# Patient Record
Sex: MALE | Race: WHITE | NOT HISPANIC OR LATINO | Employment: OTHER | URBAN - METROPOLITAN AREA
[De-identification: names, ages, dates, MRNs, and addresses within clinical notes are randomized per-mention and may not be internally consistent; named-entity substitution may affect disease eponyms.]

---

## 2017-01-27 ENCOUNTER — GENERIC CONVERSION - ENCOUNTER (OUTPATIENT)
Dept: OTHER | Facility: OTHER | Age: 37
End: 2017-01-27

## 2017-01-31 ENCOUNTER — ALLSCRIPTS OFFICE VISIT (OUTPATIENT)
Dept: OTHER | Facility: OTHER | Age: 37
End: 2017-01-31

## 2017-02-17 ENCOUNTER — APPOINTMENT (EMERGENCY)
Dept: RADIOLOGY | Facility: HOSPITAL | Age: 37
End: 2017-02-17
Payer: MEDICARE

## 2017-02-17 ENCOUNTER — HOSPITAL ENCOUNTER (EMERGENCY)
Facility: HOSPITAL | Age: 37
Discharge: HOME | End: 2017-02-17
Attending: EMERGENCY MEDICINE | Admitting: EMERGENCY MEDICINE
Payer: MEDICARE

## 2017-02-17 VITALS
BODY MASS INDEX: 35.78 KG/M2 | SYSTOLIC BLOOD PRESSURE: 155 MMHG | HEART RATE: 92 BPM | OXYGEN SATURATION: 98 % | TEMPERATURE: 98.4 F | WEIGHT: 215 LBS | RESPIRATION RATE: 18 BRPM | DIASTOLIC BLOOD PRESSURE: 72 MMHG

## 2017-02-17 DIAGNOSIS — S80.11XA CONTUSION OF RIGHT TIBIA: ICD-10-CM

## 2017-02-17 DIAGNOSIS — T07.XXXA MULTIPLE ABRASIONS: ICD-10-CM

## 2017-02-17 DIAGNOSIS — S50.11XA CONTUSION OF RIGHT ELBOW AND FOREARM, INITIAL ENCOUNTER: ICD-10-CM
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
## 2017-02-17 DIAGNOSIS — M25.511 ACUTE PAIN OF RIGHT SHOULDER: ICD-10-CM

## 2017-02-17 DIAGNOSIS — S00.33XA CONTUSION OF NOSE, INITIAL ENCOUNTER: ICD-10-CM

## 2017-02-17 LAB
ABO GROUP BLD: NORMAL
ANION GAP SERPL CALCULATED.3IONS-SCNC: 7 MMOL/L (ref 4–13)
APTT PPP: 29 SECONDS (ref 24–36)
ATRIAL RATE: 79 BPM
BASOPHILS # BLD AUTO: 0.01 THOUSANDS/ΜL (ref 0–0.1)
BASOPHILS NFR BLD AUTO: 0 % (ref 0–1)
BLD GP AB SCN SERPL QL: NEGATIVE
BUN SERPL-MCNC: 13 MG/DL (ref 5–25)
CALCIUM SERPL-MCNC: 9.4 MG/DL (ref 8.3–10.1)
CHLORIDE SERPL-SCNC: 98 MMOL/L (ref 100–108)
CO2 SERPL-SCNC: 28 MMOL/L (ref 21–32)
CREAT SERPL-MCNC: 1.1 MG/DL (ref 0.6–1.3)
EOSINOPHIL # BLD AUTO: 0.05 THOUSAND/ΜL (ref 0–0.61)
EOSINOPHIL NFR BLD AUTO: 1 % (ref 0–6)
ERYTHROCYTE [DISTWIDTH] IN BLOOD BY AUTOMATED COUNT: 13.8 % (ref 11.6–15.1)
GFR SERPL CREATININE-BSD FRML MDRD: >60 ML/MIN/1.73SQ M
GLUCOSE SERPL-MCNC: 97 MG/DL (ref 65–140)
HCT VFR BLD AUTO: 37 % (ref 36.5–49.3)
HGB BLD-MCNC: 11.7 G/DL (ref 12–17)
INR PPP: 1.06 (ref 0.86–1.16)
LYMPHOCYTES # BLD AUTO: 1.89 THOUSANDS/ΜL (ref 0.6–4.47)
LYMPHOCYTES NFR BLD AUTO: 19 % (ref 14–44)
MCH RBC QN AUTO: 27.5 PG (ref 26.8–34.3)
MCHC RBC AUTO-ENTMCNC: 31.6 G/DL (ref 31.4–37.4)
MCV RBC AUTO: 87 FL (ref 82–98)
MONOCYTES # BLD AUTO: 0.61 THOUSAND/ΜL (ref 0.17–1.22)
MONOCYTES NFR BLD AUTO: 6 % (ref 4–12)
NEUTROPHILS # BLD AUTO: 7.55 THOUSANDS/ΜL (ref 1.85–7.62)
NEUTS SEG NFR BLD AUTO: 74 % (ref 43–75)
NRBC BLD AUTO-RTO: 0 /100 WBCS
P AXIS: 48 DEGREES
PLATELET # BLD AUTO: 437 THOUSANDS/UL (ref 149–390)
PMV BLD AUTO: 8.5 FL (ref 8.9–12.7)
POTASSIUM SERPL-SCNC: 4 MMOL/L (ref 3.5–5.3)
PR INTERVAL: 132 MS
PROTHROMBIN TIME: 13.9 SECONDS (ref 12–14.3)
QRS AXIS: 71 DEGREES
QRSD INTERVAL: 86 MS
QT INTERVAL: 354 MS
QTC INTERVAL: 405 MS
RBC # BLD AUTO: 4.26 MILLION/UL (ref 3.88–5.62)
RH BLD: POSITIVE
SODIUM SERPL-SCNC: 133 MMOL/L (ref 136–145)
T WAVE AXIS: 42 DEGREES
VENTRICULAR RATE: 79 BPM
WBC # BLD AUTO: 10.13 THOUSAND/UL (ref 4.31–10.16)

## 2017-02-17 PROCEDURE — 73610 X-RAY EXAM OF ANKLE: CPT

## 2017-02-17 PROCEDURE — 96374 THER/PROPH/DIAG INJ IV PUSH: CPT

## 2017-02-17 PROCEDURE — 85025 COMPLETE CBC W/AUTO DIFF WBC: CPT | Performed by: EMERGENCY MEDICINE

## 2017-02-17 PROCEDURE — 73030 X-RAY EXAM OF SHOULDER: CPT

## 2017-02-17 PROCEDURE — 85730 THROMBOPLASTIN TIME PARTIAL: CPT | Performed by: EMERGENCY MEDICINE

## 2017-02-17 PROCEDURE — 86900 BLOOD TYPING SEROLOGIC ABO: CPT | Performed by: EMERGENCY MEDICINE

## 2017-02-17 PROCEDURE — 90715 TDAP VACCINE 7 YRS/> IM: CPT | Performed by: EMERGENCY MEDICINE

## 2017-02-17 PROCEDURE — 93005 ELECTROCARDIOGRAM TRACING: CPT | Performed by: EMERGENCY MEDICINE

## 2017-02-17 PROCEDURE — 86901 BLOOD TYPING SEROLOGIC RH(D): CPT | Performed by: EMERGENCY MEDICINE

## 2017-02-17 PROCEDURE — 71010 HB CHEST X-RAY 1 VIEW FRONTAL (PORTABLE): CPT

## 2017-02-17 PROCEDURE — 80048 BASIC METABOLIC PNL TOTAL CA: CPT | Performed by: EMERGENCY MEDICINE

## 2017-02-17 PROCEDURE — 71010 HB CHEST X-RAY 1 VIEW FRONTAL: CPT

## 2017-02-17 PROCEDURE — 73590 X-RAY EXAM OF LOWER LEG: CPT

## 2017-02-17 PROCEDURE — 99285 EMERGENCY DEPT VISIT HI MDM: CPT

## 2017-02-17 PROCEDURE — 73110 X-RAY EXAM OF WRIST: CPT

## 2017-02-17 PROCEDURE — 72125 CT NECK SPINE W/O DYE: CPT

## 2017-02-17 PROCEDURE — 85610 PROTHROMBIN TIME: CPT | Performed by: EMERGENCY MEDICINE

## 2017-02-17 PROCEDURE — 96375 TX/PRO/DX INJ NEW DRUG ADDON: CPT

## 2017-02-17 PROCEDURE — 96376 TX/PRO/DX INJ SAME DRUG ADON: CPT

## 2017-02-17 PROCEDURE — 36415 COLL VENOUS BLD VENIPUNCTURE: CPT | Performed by: EMERGENCY MEDICINE

## 2017-02-17 PROCEDURE — 73564 X-RAY EXAM KNEE 4 OR MORE: CPT

## 2017-02-17 PROCEDURE — 86850 RBC ANTIBODY SCREEN: CPT | Performed by: EMERGENCY MEDICINE

## 2017-02-17 PROCEDURE — 70450 CT HEAD/BRAIN W/O DYE: CPT

## 2017-02-17 PROCEDURE — 73080 X-RAY EXAM OF ELBOW: CPT

## 2017-02-17 PROCEDURE — 90471 IMMUNIZATION ADMIN: CPT

## 2017-02-17 RX ORDER — IBUPROFEN 600 MG/1
600 TABLET ORAL EVERY 6 HOURS PRN
Qty: 30 TABLET | Refills: 0 | Status: SHIPPED | OUTPATIENT
Start: 2017-02-17 | End: 2017-07-06

## 2017-02-17 RX ORDER — BACITRACIN, NEOMYCIN, POLYMYXIN B 400; 3.5; 5 [USP'U]/G; MG/G; [USP'U]/G
1 OINTMENT TOPICAL ONCE
Status: COMPLETED | OUTPATIENT
Start: 2017-02-17 | End: 2017-02-17

## 2017-02-17 RX ORDER — KETOROLAC TROMETHAMINE 30 MG/ML
15 INJECTION, SOLUTION INTRAMUSCULAR; INTRAVENOUS ONCE
Status: COMPLETED | OUTPATIENT
Start: 2017-02-17 | End: 2017-02-17

## 2017-02-17 RX ORDER — GINSENG 100 MG
1 CAPSULE ORAL ONCE
Status: DISCONTINUED | OUTPATIENT
Start: 2017-02-17 | End: 2017-02-17

## 2017-02-17 RX ORDER — ALPRAZOLAM 1 MG/1
TABLET ORAL
COMMUNITY
Start: 2016-03-01 | End: 2017-02-17

## 2017-02-17 RX ORDER — VENLAFAXINE HYDROCHLORIDE 150 MG/1
CAPSULE, EXTENDED RELEASE ORAL
COMMUNITY
Start: 2016-12-15 | End: 2017-07-06

## 2017-02-17 RX ORDER — IBUPROFEN 200 MG
TABLET ORAL
COMMUNITY
End: 2017-02-17

## 2017-02-17 RX ORDER — TRAZODONE HYDROCHLORIDE 100 MG/1
TABLET ORAL
COMMUNITY
Start: 2016-12-15 | End: 2017-02-17

## 2017-02-17 RX ORDER — BACITRACIN, NEOMYCIN, POLYMYXIN B 400; 3.5; 5 [USP'U]/G; MG/G; [USP'U]/G
OINTMENT TOPICAL
Status: COMPLETED
Start: 2017-02-17 | End: 2017-02-17

## 2017-02-17 RX ADMIN — HYDROMORPHONE HYDROCHLORIDE 1 MG: 1 INJECTION, SOLUTION INTRAMUSCULAR; INTRAVENOUS; SUBCUTANEOUS at 17:20

## 2017-02-17 RX ADMIN — BACITRACIN, NEOMYCIN, POLYMYXIN B 1 SMALL APPLICATION: 400; 3.5; 5 OINTMENT TOPICAL at 19:06

## 2017-02-17 RX ADMIN — Medication 1 MG: at 16:25

## 2017-02-17 RX ADMIN — TETANUS TOXOID, REDUCED DIPHTHERIA TOXOID AND ACELLULAR PERTUSSIS VACCINE, ADSORBED 0.5 ML: 5; 2.5; 8; 8; 2.5 SUSPENSION INTRAMUSCULAR at 18:56

## 2017-02-17 RX ADMIN — HYDROMORPHONE HYDROCHLORIDE 1 MG: 1 INJECTION, SOLUTION INTRAMUSCULAR; INTRAVENOUS; SUBCUTANEOUS at 16:25

## 2017-02-17 RX ADMIN — KETOROLAC TROMETHAMINE 15 MG: 30 INJECTION, SOLUTION INTRAMUSCULAR at 19:25

## 2017-04-03 ENCOUNTER — ALLSCRIPTS OFFICE VISIT (OUTPATIENT)
Dept: OTHER | Facility: OTHER | Age: 37
End: 2017-04-03

## 2017-05-03 ENCOUNTER — ALLSCRIPTS OFFICE VISIT (OUTPATIENT)
Dept: OTHER | Facility: OTHER | Age: 37
End: 2017-05-03

## 2017-05-30 ENCOUNTER — GENERIC CONVERSION - ENCOUNTER (OUTPATIENT)
Dept: OTHER | Facility: OTHER | Age: 37
End: 2017-05-30

## 2017-06-07 ENCOUNTER — ALLSCRIPTS OFFICE VISIT (OUTPATIENT)
Dept: OTHER | Facility: OTHER | Age: 37
End: 2017-06-07

## 2017-07-06 ENCOUNTER — APPOINTMENT (EMERGENCY)
Dept: RADIOLOGY | Facility: HOSPITAL | Age: 37
End: 2017-07-06
Payer: MEDICARE

## 2017-07-06 ENCOUNTER — HOSPITAL ENCOUNTER (EMERGENCY)
Facility: HOSPITAL | Age: 37
Discharge: HOME | End: 2017-07-06
Attending: EMERGENCY MEDICINE | Admitting: EMERGENCY MEDICINE
Payer: MEDICARE

## 2017-07-06 VITALS
TEMPERATURE: 96.9 F | SYSTOLIC BLOOD PRESSURE: 150 MMHG | OXYGEN SATURATION: 95 % | HEIGHT: 65 IN | DIASTOLIC BLOOD PRESSURE: 78 MMHG | HEART RATE: 95 BPM | BODY MASS INDEX: 35.82 KG/M2 | RESPIRATION RATE: 16 BRPM | WEIGHT: 215 LBS

## 2017-07-06 DIAGNOSIS — R00.0 TACHYCARDIA: ICD-10-CM

## 2017-07-06 DIAGNOSIS — F11.10 HEROIN ABUSE (HCC): ICD-10-CM

## 2017-07-06 DIAGNOSIS — R11.0 NAUSEA: ICD-10-CM

## 2017-07-06 DIAGNOSIS — T50.901A OVERDOSE: Primary | ICD-10-CM

## 2017-07-06 LAB
ANION GAP SERPL CALCULATED.3IONS-SCNC: 4 MMOL/L (ref 4–13)
ATRIAL RATE: 107 BPM
ATRIAL RATE: 114 BPM
BASOPHILS # BLD AUTO: 0.01 THOUSANDS/ΜL (ref 0–0.1)
BASOPHILS NFR BLD AUTO: 0 % (ref 0–1)
BILIRUB UR QL STRIP: NEGATIVE
BUN SERPL-MCNC: 11 MG/DL (ref 5–25)
CALCIUM SERPL-MCNC: 8.2 MG/DL (ref 8.3–10.1)
CHLORIDE SERPL-SCNC: 102 MMOL/L (ref 100–108)
CLARITY UR: CLEAR
CO2 SERPL-SCNC: 28 MMOL/L (ref 21–32)
COLOR UR: YELLOW
COLOR, POC: YELLOW
CREAT SERPL-MCNC: 1.01 MG/DL (ref 0.6–1.3)
DEPRECATED D DIMER PPP: 897 NG/ML (FEU) (ref 0–424)
EOSINOPHIL # BLD AUTO: 0.02 THOUSAND/ΜL (ref 0–0.61)
EOSINOPHIL NFR BLD AUTO: 0 % (ref 0–6)
ERYTHROCYTE [DISTWIDTH] IN BLOOD BY AUTOMATED COUNT: 14.4 % (ref 11.6–15.1)
GFR SERPL CREATININE-BSD FRML MDRD: >60 ML/MIN/1.73SQ M
GLUCOSE SERPL-MCNC: 124 MG/DL (ref 65–140)
GLUCOSE UR STRIP-MCNC: NEGATIVE MG/DL
HCT VFR BLD AUTO: 36.3 % (ref 36.5–49.3)
HGB BLD-MCNC: 11.7 G/DL (ref 12–17)
HGB UR QL STRIP.AUTO: NEGATIVE
KETONES UR STRIP-MCNC: NEGATIVE MG/DL
LEUKOCYTE ESTERASE UR QL STRIP: NEGATIVE
LYMPHOCYTES # BLD AUTO: 0.78 THOUSANDS/ΜL (ref 0.6–4.47)
LYMPHOCYTES NFR BLD AUTO: 9 % (ref 14–44)
MCH RBC QN AUTO: 28.3 PG (ref 26.8–34.3)
MCHC RBC AUTO-ENTMCNC: 32.2 G/DL (ref 31.4–37.4)
MCV RBC AUTO: 88 FL (ref 82–98)
MONOCYTES # BLD AUTO: 0.6 THOUSAND/ΜL (ref 0.17–1.22)
MONOCYTES NFR BLD AUTO: 7 % (ref 4–12)
NEUTROPHILS # BLD AUTO: 7.66 THOUSANDS/ΜL (ref 1.85–7.62)
NEUTS SEG NFR BLD AUTO: 84 % (ref 43–75)
NITRITE UR QL STRIP: NEGATIVE
NRBC BLD AUTO-RTO: 0 /100 WBCS
P AXIS: 30 DEGREES
P AXIS: 38 DEGREES
PH UR STRIP.AUTO: 6 [PH] (ref 4.5–8)
PLATELET # BLD AUTO: 242 THOUSANDS/UL (ref 149–390)
PMV BLD AUTO: 8.9 FL (ref 8.9–12.7)
POTASSIUM SERPL-SCNC: 3.8 MMOL/L (ref 3.5–5.3)
PR INTERVAL: 134 MS
PR INTERVAL: 136 MS
PROT UR STRIP-MCNC: NEGATIVE MG/DL
QRS AXIS: 56 DEGREES
QRS AXIS: 62 DEGREES
QRSD INTERVAL: 90 MS
QRSD INTERVAL: 94 MS
QT INTERVAL: 310 MS
QT INTERVAL: 318 MS
QTC INTERVAL: 413 MS
QTC INTERVAL: 438 MS
RBC # BLD AUTO: 4.13 MILLION/UL (ref 3.88–5.62)
SODIUM SERPL-SCNC: 134 MMOL/L (ref 136–145)
SP GR UR STRIP.AUTO: 1.01 (ref 1–1.03)
T WAVE AXIS: 20 DEGREES
T WAVE AXIS: 22 DEGREES
UROBILINOGEN UR QL STRIP.AUTO: 0.2 E.U./DL
VENTRICULAR RATE: 107 BPM
VENTRICULAR RATE: 114 BPM
WBC # BLD AUTO: 9.09 THOUSAND/UL (ref 4.31–10.16)

## 2017-07-06 PROCEDURE — 81002 URINALYSIS NONAUTO W/O SCOPE: CPT | Performed by: EMERGENCY MEDICINE

## 2017-07-06 PROCEDURE — 80048 BASIC METABOLIC PNL TOTAL CA: CPT | Performed by: EMERGENCY MEDICINE

## 2017-07-06 PROCEDURE — 96374 THER/PROPH/DIAG INJ IV PUSH: CPT

## 2017-07-06 PROCEDURE — 71020 HB CHEST X-RAY 2VW FRONTAL&LATL: CPT

## 2017-07-06 PROCEDURE — 85025 COMPLETE CBC W/AUTO DIFF WBC: CPT | Performed by: EMERGENCY MEDICINE

## 2017-07-06 PROCEDURE — 93005 ELECTROCARDIOGRAM TRACING: CPT

## 2017-07-06 PROCEDURE — 85379 FIBRIN DEGRADATION QUANT: CPT | Performed by: EMERGENCY MEDICINE

## 2017-07-06 PROCEDURE — 71275 CT ANGIOGRAPHY CHEST: CPT

## 2017-07-06 PROCEDURE — 96361 HYDRATE IV INFUSION ADD-ON: CPT

## 2017-07-06 PROCEDURE — 99285 EMERGENCY DEPT VISIT HI MDM: CPT

## 2017-07-06 PROCEDURE — 36415 COLL VENOUS BLD VENIPUNCTURE: CPT | Performed by: EMERGENCY MEDICINE

## 2017-07-06 PROCEDURE — 81003 URINALYSIS AUTO W/O SCOPE: CPT

## 2017-07-06 RX ORDER — LORAZEPAM 2 MG/ML
1 INJECTION INTRAMUSCULAR ONCE
Status: COMPLETED | OUTPATIENT
Start: 2017-07-06 | End: 2017-07-06

## 2017-07-06 RX ADMIN — LORAZEPAM 1 MG: 2 INJECTION INTRAMUSCULAR; INTRAVENOUS at 11:16

## 2017-07-06 RX ADMIN — IOHEXOL 85 ML: 350 INJECTION, SOLUTION INTRAVENOUS at 14:13

## 2017-07-06 RX ADMIN — SODIUM CHLORIDE 1000 ML: 0.9 INJECTION, SOLUTION INTRAVENOUS at 08:28

## 2017-07-06 RX ADMIN — SODIUM CHLORIDE 1000 ML: 0.9 INJECTION, SOLUTION INTRAVENOUS at 11:17

## 2017-10-03 ENCOUNTER — GENERIC CONVERSION - ENCOUNTER (OUTPATIENT)
Dept: OTHER | Facility: OTHER | Age: 37
End: 2017-10-03

## 2018-01-10 NOTE — MISCELLANEOUS
Provider Comments  Provider Comments:   Patient was a no show for today's scheduled appointment at 2:30 PM       Signatures   Electronically signed by : Aris Motta MD; Jun 27 2016  2:58PM EST                       (Author)

## 2018-01-10 NOTE — RESULT NOTES
Verified Results  (1) CULTURE, BLOOD BACT 18Apr2016 10:03AM Patience Conway     Test Name Result Flag Reference   CLINICAL REPORT (Report)     Test:        Blood culture  Specimen Type:   Blood  Specimen Date:   4/18/2016 10:03 AM  Result Date:    4/23/2016 4:01 PM  Result Status:   Final result  Resulting Lab:   [facility] [address] [address]            Tel: [phone]                 CULTURE                                       ------------------                                   No Growth After 5 Days  Discussion/Summary   blood culture was negative

## 2018-01-10 NOTE — MISCELLANEOUS
Message   Recorded as Task   Date: 09/27/2016 11:47 AM, Created By: Jessica Banegas   Task Name: Med Renewal Request   Assigned To: Sybil Bass   Regarding Patient: Lillie Crain, Status: Active   CommentKemal Ross - 27 Sep 2016 11:47 AM     TASK CREATED  Caller: Self; Renew Medication; [phone] (Home)  saw Dr Lexi Castrejon, has an appt  with you 10/20, needs to have the following scripts filled:    Alprazolam - 1mg  Zoloft - 150mg  [facility]     patient cell - [phone]   Andry Oksana left msg requesting renewal of Sertraline and Alprazolam  Return appt is 10/20/2016   I renewed the following to the designated [facility] pharmacy:  Sertraline 100mg (1 1/2) tabs po qd # 45  Alprazolam 1mg (1) tab po tid prn anxiety # 60      Plan  Anxiety    · ALPRAZolam 1 MG Oral Tablet; 1 TABLET PO TID PRN ANXIETY  Anxiety,     · Sertraline HCl - 100 MG Oral Tablet (Zoloft); TAKE 1 and one=half  TABLET  DAILY    Signatures   Electronically signed by : DUSTIN Decker; Sep 27 2016  7:11PM EST                       (Author)

## 2018-01-11 NOTE — MISCELLANEOUS
Message   Recorded as Task   Date: 10/28/2016 12:21 PM, Created By: Calton Hodgkin   Task Name: Medical Complaint Callback   Assigned To: Ju Prater   Regarding Patient: Michael Mayorga, Status: Active   CommentLeone Jakeamity - 28 Oct 2016 12:21 PM     TASK CREATED  Caller: Self; Medical Complaint; [phone] (Home)  saw you last fri, calling back for something to help him sleep, pls call him  [phone]   I contacted Olamide Portillo on home phone # of record and discussed options  He had tried Trazodone at 100mg but it did not work  He had tried Zolpidem in the past at 10mg which worked better than 5mg tab  I discussed the risks, benefits and SE of the drug and Pt verbalized understanding and acceptance  Return appt is 12/15/2016   I called in the following to the [facility] pharmacy he designated:  Zolpidem 10mg (1) tab po qhs prn anxiety # 30 R1       Plan  Insomnia    · Zolpidem Tartrate 10 MG Oral Tablet; TAKE 1 TABLET PO AT  BEDTIME AS  NEEDED FOR INSOMNIA    Signatures   Electronically signed by : DUSTIN Perdomo; Oct 28 2016  6:02PM EST                       (Author)

## 2018-01-11 NOTE — PSYCH
Psych Med Mgmt    Appearance: was calm and cooperative, adequate hygiene and grooming and good eye contact  Observed mood: depressed and anxious  Observed mood: affect was constricted  Speech: a normal rate, speech soft and fluent  Thought processes: coherent/organized  Hallucinations: no hallucinations present  Thought Content: no delusions  Abnormal Thoughts: The patient has no suicidal thoughts and no homicidal thoughts  Orientation: The patient is oriented to person, place and time  Recent and Remote Memory: short term memory intact and long term memory intact  Attention Span And Concentration: concentration intact  Insight: Insight intact  Judgment: His judgment was intact  Muscle Strength And Tone  Antalgic gait, but steady  Language: no difficulty naming common objects, no difficulty repeating a phrase and no difficulty writing a sentence  Fund of knowledge: Patient displays adequate knowledge of current events, adequate fund of knowledge regarding past history and adequate fund of knowledge regarding vocabulary  The patient is experiencing moderate to severe pain  On a scale of 0 - 10 the pain severity is a 7  In hips and back  Treatment Recommendations: See plan  Risks, Benefits And Possible Side Effects Of Medications: Risks, benefits, and possible side effects of medications explained to patient and patient verbalizes understanding  Discussed with patient Black Box warning on concurrent use of benzodiazepines and opioid medications including sedation, respiratory depression, coma and death  Patient understands the risk of treatment with benzodiazepines in addition to opioids and wants to continue taking those medications  Discussed with patient the risks of sedation, respiratory depression, impairment of ability to drive and potential for abuse and addiction related to treatment with benzodiazepine medications   The patient understands risk of treatment with benzodiazepine medications, agrees to not drive if feels impaired and agrees to take medications as prescribed  The patient has been filling controlled prescriptions on time as prescribed to [facility]     He reports decreased appetite, decreased energy, no weight change and decrease in number of sleep hours   Dillan Galarza is a 35y/o white male here for medication review with primary c/o "Just anxious " He is also having panic attacks and depressed mood (these Sxs are as described below)  He is eating one meal per day but this and his mood and anxiety are in large part, due to his chronic pains and his "Family Hx" of cancers (described in below hx)  He misses his mom greatly but does not demonstrate avoidance of her home/belongings  (Pt lived with mom and still lives in the same home)  No financial stressors at this time  He presently denies any SI, HI, or manic or psychotic Sxs  Pt was first diagnosed with a psychiatric illness (Depression and anxiety) in  by PMD  His Sxs were many weeks of daily sadness, low energy, motivation and concentration, feeling overwhelmed, sometimes hopeless and helpless, once had SI, lack of appetite, and insomnia  He also had anxiety with nausea (and sometimes vomiting), fatigue, difficulty concentrating, insomnia, and edginess above and beyond the depression, and panic attacks  Panic Sxs were moderate to severe anxiety, shakes, feeling of fear, tachycardia, palpitations and SOB, Mood and anxiety triggers around that time: mom  of brain and lung CA, then 3 months after that his daughter was diagnosed with Leukemia  (Daughter has since be treated and is in remission  )    PMD prescribed Zoloft and Alprazolam and also immediately referred Pt to psychiatric care  Pt came to [place] [address] outpatient offices and first seen by Varun Jamil in 2013   She continued his PMD's medicines and tried one switch to Clonazepam but it did not work  Pt was restarted on the Alprazolam      Pt denies h/o manic or psychotic Sxs  Pt had one hospitalization in [place] for depression with SI and verbalized intent but no plan or attempt    Pt denies any h/o self-mutilation/other self harm behaviors, violent behaviors, ECT, or legal or  Hx  Results/Data  (1) CBC/PLT/DIFF 11Ism8860 09:19AM Patience Conway     Test Name Result Flag Reference   WBC COUNT 7 87 Thousand/uL  4 31-10 16   RBC COUNT 4 69 Million/uL  3 88-5 62   HEMOGLOBIN 12 6 g/dL  12 0-17 0   HEMATOCRIT 40 0 %  36 5-49 3   MCV 85 fL  82-98   MCH 26 9 pg  26 8-34 3   MCHC 31 5 g/dL  31 4-37 4   RDW 15 1 %  11 6-15 1   MPV 9 6 fL  8 9-12 7   PLATELET COUNT 494 Thousands/uL H 149-390   nRBC AUTOMATED 0 /100 WBCs     NEUTROPHILS RELATIVE PERCENT 51 %  43-75   LYMPHOCYTES RELATIVE PERCENT 37 %  14-44   MONOCYTES RELATIVE PERCENT 11 %  4-12   EOSINOPHILS RELATIVE PERCENT 1 %  0-6   BASOPHILS RELATIVE PERCENT 0 %  0-1   NEUTROPHILS ABSOLUTE COUNT 4 00 Thousands/?L  1 85-7 62   LYMPHOCYTES ABSOLUTE COUNT 2 88 Thousands/?L  0 60-4 47   MONOCYTES ABSOLUTE COUNT 0 86 Thousand/?L  0 17-1 22   EOSINOPHILS ABSOLUTE COUNT 0 10 Thousand/?L  0 00-0 61   BASOPHILS ABSOLUTE COUNT 0 01 Thousands/?L  0 00-0 10     (1) COMPREHENSIVE METABOLIC PANEL 93QRI8825 01:54FU Simran Muñoz     Test Name Result Flag Reference   GLUCOSE,RANDM 71 mg/dL     If the patient is fasting, the ADA then defines impaired fasting glucose as > 100 mg/dL and diabetes as > or equal to 123 mg/dL     SODIUM 138 mmol/L  136-145   POTASSIUM 4 3 mmol/L  3 5-5 3   CHLORIDE 101 mmol/L  100-108   CARBON DIOXIDE 29 mmol/L  21-32   ANION GAP (CALC) 8 mmol/L  4-13   BLOOD UREA NITROGEN 21 mg/dL  5-25   CREATININE 1 27 mg/dL  0 60-1 30   Standardized to IDMS reference method   CALCIUM 9 3 mg/dL  8 3-10 1   BILI, TOTAL 0 27 mg/dL  0 20-1 00   ALK PHOSPHATAS 89 U/L     ALT (SGPT) 32 U/L  12-78   AST(SGOT) 15 U/L  5-45   ALBUMIN 4 3 g/dL  3 5-5 0   TOTAL PROTEIN 7 7 g/dL  6 4-8 2   eGFR Non-African American      >60 0 ml/min/1 73sq m   [facility] Disease Education Program recommendations are as follows:  GFR calculation is accurate only with a steady state creatinine  Chronic Kidney disease less than 60 ml/min/1 73 sq  meters  Kidney failure less than 15 ml/min/1 73 sq  meters  (1) VITAMIN B12 [id number] 09:19AM Patience Conway     Test Name Result Flag Reference   VITAMIN B12 376 pg/mL  100-900     (1) TSH WITH FT4 REFLEX [id number] 09:19AM Patience Conway     Test Name Result Flag Reference   TSH 2 430 uIU/mL  0 358-3 740   Patients undergoing fluorescein dye angiography may retain small amounts of fluorescein in the body for 48-72 hours post procedure  Samples containing fluorescein can produce falsely depressed TSH values  If the patient had this procedure,a specimen should be resubmitted post fluorescein clearance  (1) LYME ANTIBODY PROFILE W/REFLEX TO WESTERN BLOT [id number] 09:19AM Patience Conway     Test Name Result Flag Reference   LYME IGG 0 06  0 00-0 79   NEGATIVE(0 00-0 79)-Absence of detectable Borrelia IgG Antibodies  A negative result does not exclude the possibility of Borrelia infection  If early Lyme disease is suspected,a second sample should be collected & tested 4 weeks after initial testing  LYME IGM 0 16  0 00-0 79   NEGATIVE (0 00-0 79)-Absence of detectable Borrelia IgM antibodies  A negative result does not exclude the possibility of Borrelia infection   If early lyme disease is suspected, a second sample should be collected & tested 4 weeks after initial testing      (1) VITAMIN B6 [id number] 09:19AM Patience Conway     Test Name Result Flag Reference   VITAMIN B6 18 4 ug/L  5 3 - 46 7   Performed at:  [address]  [id number]  : Tan Barr MD, Phone:  [phone]     (1) HEMOGLOBIN A1C 40Bqr6745 10:03AM Patience Conway   5 7-6 4% impaired fasting glucose  >=6 5% diagnosis of diabetes    Falsely low levels are seen in conditions linked to short RBC life span-  hemolytic anemia, and splenomegaly  Falsely elevated levels are seen in situations where there is an increased production of RBC- receipt of erythropoietin or blood transfusions  Adopted from ADA-Clinical Practice Recommendations     Test Name Result Flag Reference   HEMOGLOBIN A1C 5 3 %  4 0-5 6   EST  AVG  GLUCOSE 105 mg/dl       (1) LIPID PANEL FASTING W DIRECT LDL REFLEX [id number] 11:00AM Patience Conway   Triglyceride:         Normal              <150 mg/dl       Borderline High    150-199 mg/dl       High               200-499 mg/dl       Very High          >499 mg/dl  Cholesterol:         Desirable        <200 mg/dl      Borderline High  200-239 mg/dl      High             >239 mg/dl  HDL Cholesterol:        High    >59 mg/dL      Low     <41 mg/dL  LDL Cholesterol:        Optimal          <100 mg/dl         Near Optimal     100-129 mg/dl        Above Optimal          Borderline High   130-159 mg/dl          High              160-189 mg/dl          Very High        >189 mg/dl  LDL CALCULATED:    This screening LDL is a calculated result  It does not have the accuracy of the Direct Measured LDL in the monitoring of patients with hyperlipidemia and/or statin therapy  Direct Measure LDL (OXU978) must be ordered separately in these patients  Test Name Result Flag Reference   CHOLESTEROL 256 mg/dL H    LDL CHOLESTEROL CALCULATED 184 mg/dL H 0-100   TRIGLYCERIDES 164 mg/dL H <=150   HDL,DIRECT 39 mg/dL L 40-60       Vitals  Signs   Recorded: 03IAB6507 99:20VE   Systolic: 427  Diastolic: 88  Heart Rate: 92  Height: 5 ft 5 in  Weight: 213 lb 9 6 oz  BMI Calculated: 35 55  BSA Calculated: 2 03    Assessment    1  Depression, major, recurrent, moderate (296 32) (F33 1)   2  Generalized anxiety disorder (300 02) (F41 1)   3  Chronic pain (338 29) (G89 29)   4   Hypovitaminosis D (268 9) (E55 9)    Plan    1  Sertraline HCl - 100 MG Oral Tablet (Zoloft); Take 2 tabs po qd    2  ALPRAZolam 1 MG Oral Tablet; 1 TABLET PO TID PRN ANXIETY    Discussed his Sxs and Tx options  Pt is having moderate anxiety and depressive Sxs and some unresolved grief for mom  I am increasing the Sertraline and reviewed risks, benefits and SE of his medicines  Pt verbalized understanding and acceptance of the plan  Increase Sertraline to 100mg (2) tabs po qd # 60 R1  Continue:  Alprazolam 1mg (1) tab po tid prn anxiety # 90 R1  Continue Vit D supplement (over the counter)--as per PMD recommendation  I recommended psychotherapy and bereavement counseling  I also informed Pt of group therapies available  Pt will consider these  Pt to f/u with PMD and specialists   Return 2 months (soonest avail), and call sooner prn  Review of Systems    Constitutional: No fever, no chills, feels well, no tiredness, no recent weight gain or loss  Cardiovascular: no complaints of slow or fast heart rate, no chest pain, no palpitations  Respiratory: no complaints of shortness of breath, no wheezing, no dyspnea on exertion  Gastrointestinal: no complaints of abdominal pain, no constipation, no nausea, no diarrhea, no vomiting  Genitourinary: no complaints of dysuria, no incontinence, no pelvic pain, no urinary frequency  Musculoskeletal: hips and back, but as noted in HPI  Integumentary: no complaints of skin rash, no itching, no dry skin  Neurological: no complaints of headache, no confusion, no numbness, no dizziness  Past Psychiatric History    Past Psychiatric History: Pt was first diagnosed with a psychiatric illness (Depression and anxiety) in 2013 by PMD  His Sxs were many weeks of daily sadness, low energy, motivation and concentration, feeling overwhelmed, sometimes hopeless and helpless, once had SI, lack of appetite, and insomnia   He also had anxiety with nausea (and sometimes vomiting), fatigue, difficulty concentrating, insomnia, and edginess above and beyond the depression, and panic attacks  Panic Sxs were moderate to severe anxiety, shakes, feeling of fear, tachycardia, palpitations and SOB, Mood and anxiety triggers around that time: mom  of brain and lung CA, then 3 months after that his daughter was diagnosed with Leukemia  (Daughter has since be treated and is in remission  )    PMD prescribed Zoloft and Alprazolam and also immediately referred Pt to psychiatric care  Pt came to [facility] outpatient offices and first seen by Lb Mares in 2013  She continued his PMD's medicines and tried one switch to Clonazepam but it did not work  Pt was restarted on the Alprazolam      Pt denies h/o manic or psychotic Sxs  Pt had one hospitalization in [place] for depression with SI and verbalized intent but no plan or attempt    Pt denies any h/o self-mutilation/other self harm behaviors, violent behaviors, ECT, or legal or  Hx  Substance Abuse Hx    Substance Abuse History: Pt denies any ETOH use or abuse    He tried [facility] as a teenager but denies any addiction or use/abuse of any other illicit substances  Active Problems    1  Analgesic use (V58 69) (Z79 899)   2  Anxiety (300 00) (F41 9)   3  Avascular necrosis of hip (733 42) (M87 059)   4  Bilateral hip pain (719 45) (M25 551,M25 552)   5  Cerumen impaction (380 4) (H61 20)   6  Chronic pain (338 29) (G89 29)   7  Decreased appetite (783 0) (R63 0)   8  Decreased oral intake (783 9) (R63 8)   9  Depression, major, recurrent, moderate (296 32) (F33 1)   10  Elevated BP (401 9) (I10)   11  Elevated serum creatinine (790 99) (R79 89)   12  Elevated WBC count (288 60) (D72 829)   13  Fatigue (780 79) (R53 83)   14  Generalized anxiety disorder (300 02) (F41 1)   15  Greater trochanteric bursitis of both hips (726 5) (M70 62,M70 61)   16  Hospital discharge follow-up (V67 59) (Z09)   17   Hyperlipidemia (272 4) (E78 5)   18  Hypovitaminosis D (268 9) (E55 9)   19  Impaired fasting glucose (790 21) (R73 01)   20  Increased platelet count (158 46) (D47 3)   21  Insomnia (780 52) (G47 00)   22  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)   23  Low back pain (724 2) (M54 5)   24  Myofascial pain syndrome (729 1) (M79 1)   25  Nausea (787 02) (R11 0)   26  Obesity (278 00) (E66 9)   27  Osteoarthritis (arthritis due to wear and tear of joints) (715 90) (M19 90)   28  Preop examination (V72 84) (Z01 818)   29  Preoperative clearance (V72 84) (Z01 818)   30  Right calf pain (729 5) (M79 661)   31  Right ear pain (388 70) (H92 01)   32  Right shoulder pain (719 41) (M25 511)   33  S/P hip replacement, right (V43 64) (Z96 641)   34  Screening for lipoid disorders (V77 91) (Z13 220)   35  Unintentional weight loss (783 21) (R63 4)   36  Weight gain (783 1) (R63 5)    Past Medical History    1  History of Aftercare following joint replacement surgery (V54 81) (Z47 1)   2  Anxiety (300 00) (F41 9)   3  History of Jfxl-Lzyls-Axddzbf disease (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 1) (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 10)   4  Osteoarthritis (arthritis due to wear and tear of joints) (715 90) (M19 90)   5  History of Status post left hip replacement (V43 64) (O35 596)   6  History of Weight gain (783 1) (R63 5)    The active problems and past medical history were reviewed and updated today  Allergies    1  Vicodin TABS    Current Meds   1  Advil 200 MG Oral Tablet; Take 2 tab BID; Therapy: (Pansy Seen) to Recorded   2  ALPRAZolam 1 MG Oral Tablet; 1 TABLET PO TID PRN ANXIETY; Therapy: 02FGI3118 to (Evaluate:17Oct2016); Last Rx:38Rsx6449 Ordered   3  Betamethasone Sod Phos & Acet 6 (3-3) MG/ML Injection Suspension; 4 ml; To Be Done:   94NUE7569; Status: HOLD FOR - Administration Ordered   4  Cyclobenzaprine HCl - 5 MG Oral Tablet; TAKE 1 TABLET AT BEDTIME AS NEEDED; Therapy: 18Apr2016 to (Evaluate:86Igv5646)  Requested for: 41DXV7453; Last   KF:29VXH7142 Ordered   5  Naproxen 500 MG Oral Tablet; Therapy: 51YSJ0886 to (Evaluate:25Nxx6105) Recorded   6  OxyCODONE HCl - 5 MG Oral Tablet; Therapy: (Cheyenne Siddiqui) to Recorded   7  Sertraline HCl - 100 MG Oral Tablet; TAKE 1 and one=half  TABLET DAILY; Therapy: 33LZC1009 to (0488 90 45 88)  Requested for: 54YUU0959; Last   Rx:69Ggm7479 Ordered   8  TraMADol HCl - 50 MG Oral Tablet; TAKE 1 TABLET as needed daily; Therapy: 11Wiq4106 to (Evaluate:42Axo8571); Last Rx:57Gea9063 Ordered   9  Vitamin D (Ergocalciferol) 34054 UNIT Oral Capsule; TAKE 1 CAPSULE ONE A WEEK   FOR 8 WEEKS  THEN MONTHLY FOR 2 MONTHS; Therapy: 23HWF4681 to (Evaluate:05Jun2016)  Requested for: 14HGM3370; Last   AO:88RMH7204 Ordered    The medication list was reviewed and updated today  Family Psych History  Mother    1  Family history of lung cancer (V16 1) (Z80 1)   2  Family history of malignant neoplasm of brain (V16 8) (Z80 8)  Father    3  Family history of cerebrovascular accident (V17 1) (Z82 3)   4  Family history of hypertension (V17 49) (Z82 49)  Daughter    5  Family history of leukemia (V16 6) (Z80 6)  Maternal Grandmother    6  Family history of malignant neoplasm of breast (V16 3) (Z80 3)  Maternal Grandfather    7  Family history of myocardial infarction (V17 3) (Z82 49)  Maternal Uncle    8  Family history of Bone cancer    The family history was reviewed and updated today  Social History    · Never a smoker   · No alcohol use   · No drug use   · On disability   · Single   · Two children   · Unemployed, not looking for work  The social history was reviewed and updated today  History Of Phys/Sex Abuse Or Perpetration    History Of Phys/Sex Abuse or Perpetration: Pt denies any h/o childhood physical or sexual abuse  Education  Pt had h/o speech impediment and received therapy  He reached childhood milestones on time as far as he knows    Graduated high school 5223  No college  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 from 6545-0924     End of [address]    1  Sertraline HCl - 100 MG Oral Tablet (Zoloft); Take 2 tabs po qd; Therapy: 10JLR7310 to (Evaluate:19Esk3744)  Requested for: 81DTZ6580; Last   Rx:00Iwl9460 Ordered    2  Betamethasone Sod Phos & Acet 6 (3-3) MG/ML Injection Suspension; 4 ml; To Be Done:   98MDY1143; Status: HOLD FOR - Administration Ordered   3  Cyclobenzaprine HCl - 5 MG Oral Tablet; TAKE 1 TABLET AT BEDTIME AS NEEDED; Therapy: 43Lit0799 to (Evaluate:65Vdu5418)  Requested for: 57NFH6698; Last   KJ:82GPB1348 Ordered    4  TraMADol HCl - 50 MG Oral Tablet; TAKE 1 TABLET as needed daily; Therapy: 33Bag8921 to (Evaluate:64Lzd5850); Last Rx:12Vzs8178 Ordered    5  ALPRAZolam 1 MG Oral Tablet; 1 TABLET PO TID PRN ANXIETY; Therapy: 14VUQ8670 to (Evaluate:34Pte3772); Last Rx:79Bbn8434 Ordered    6  Advil 200 MG Oral Tablet; Take 2 tab BID; Therapy: (Woody Batres) to Recorded    7  Vitamin D (Ergocalciferol) 05821 UNIT Oral Capsule; TAKE 1 CAPSULE ONE A WEEK   FOR 8 WEEKS  THEN MONTHLY FOR 2 MONTHS; Therapy: 04ISB8331 to (Evaluate:80Goq6215)  Requested for: 46KYS3597; Last   VW:96DHM9218 Ordered    8  OxyCODONE HCl - 5 MG Oral Tablet; Therapy: (Recorded:07Jfy2199) to Recorded    9  Naproxen 500 MG Oral Tablet;    Therapy: 59ZMB8457 to (Evaluate:20Peu4097) Recorded    Future Appointments    Date/Time Provider Specialty Site   12/15/2016 11:30 AM DUSTIN Garcia Psychiatry [facility]     Signatures   Electronically signed by : DUSTIN Dorsey; Oct 20 2016 11:03AM EST                       (Author)    Electronically signed by : NATHALIE Hidalgo ; Oct 20 2016 12:51PM EST                       (Author)

## 2018-01-11 NOTE — MISCELLANEOUS
Provider Comments  Provider Comments:   Patient was a no show for his appointment on 05/02/2016      Signatures   Electronically signed by : Abiodun Al MD; May  2 2016 10:27AM EST                       (Author)

## 2018-01-12 NOTE — MISCELLANEOUS
Provider Comments  Provider Comments:   PT NO SHOW FOR TODAYS APPT FOR 2M F/U      Signatures   Electronically signed by : Sana Guerra MD; Jan 12 2016  9:43AM EST                       (Author)

## 2018-01-12 NOTE — PSYCH
Psych Med Mgmt    Appearance: was calm and cooperative, adequate hygiene and grooming and good eye contact  Observed mood: was dysphoric and anxious  Observed mood: affect was constricted   Less constricted  Speech: a normal rate and fluent   Normal volume  Thought processes: coherent/organized  Hallucinations: no hallucinations present  Thought Content: no delusions  Abnormal Thoughts: The patient has no suicidal thoughts and no homicidal thoughts  Orientation: The patient is oriented to person, place and time  Recent and Remote Memory: short term memory intact and long term memory intact  Attention Span And Concentration: concentration intact  Insight: Insight intact  Judgment: His judgment was intact  Muscle Strength And Tone  Normal gait and station  Language: no difficulty naming common objects and no difficulty repeating a phrase  Fund of knowledge: Patient displays adequate knowledge of current events, adequate fund of knowledge regarding past history and adequate fund of knowledge regarding vocabulary  The patient is experiencing moderate to severe pain  On a scale of 0 - 10 the pain severity is a 7  Hips and back  Treatment Recommendations: Pt's anxiety is moderate but improved, depression is now mild  Due to initial SE of anxiety with Venlafaxine ER, I will not increase the dose, will give time for further acclimation and will switch medicine if the SE does not resolve  Pt accepts the plan  Continue:  Venlafaxine ER 150mg (1) cap po qd # 30 R1  Trazodone 100mg (1 1/2 - 2) tabs po qhs prn insomnia # 60 R1  Alprazolam 1mg (1) tab po tid prn anxiety # 90 R1  Pt is unable to manage incorporating psychotherapy into his schedule at this time but will consider it for the future--I encouraged this  F/U specialists for medical issues/chronic pain  Return 2 months--Pt requests not sooner than 2 months due to his physical pains     Risks, Benefits And Possible Side Effects Of Medications: Risks, benefits, and possible side effects of medications explained to patient and patient verbalizes understanding  Discussed with patient the risks of sedation, respiratory depression, impairment of ability to drive and potential for abuse and addiction related to treatment with benzodiazepine medications  The patient understands risk of treatment with benzodiazepine medications, agrees to not drive if feels impaired and agrees to take medications as prescribed  He reports normal appetite, normal energy level, recent 11 lbs lbs weight gain  and decrease in number of sleep hours But improving  Josh Mtz is a 37y/o male here for medication review with primary c/o "The medicine made the anxiety worse, but I read up on it and for the first couple of weeks it does that " Pt refers to the Venlafaxine ER and states he wants to give the medicine more of a chance  He feels a significant improvement of mood with it and is getting out of the house more  Same stressors as last visit  He has had a couple of panic attacks but is able to calm himself down and the Alprazolam helps  He presently denies full depression, SI, HI, or manic or psychotic Sxs  Vitals  Signs   Recorded: [id number] 06:00PM   Heart Rate: 92  Height: 5 ft 5 in  Weight: 221 lb   BMI Calculated: 36 78  BSA Calculated: 2 06    Assessment    1  Generalized anxiety disorder (300 02) (F41 1)   2  Depression, major, recurrent, moderate (296 32) (F33 1)   3  Chronic pain (338 29) (G89 29)    Plan    1  Venlafaxine HCl  MG Oral Capsule Extended Release 24 Hour (Effexor   XR); take 1 capsule daily    2  ALPRAZolam 1 MG Oral Tablet; Take 1 tablet PO TID prn anxiety   3  TraZODone HCl - 100 MG Oral Tablet; Take 1 1/2 - 2 tabs po qhs prn insomnia    Review of Systems    Constitutional: No fever, no chills, feels well, no tiredness, no recent weight gain or loss     Cardiovascular: no complaints of slow or fast heart rate, no chest pain, no palpitations  Respiratory: no complaints of shortness of breath, no wheezing, no dyspnea on exertion  Gastrointestinal: no complaints of abdominal pain, no constipation, no nausea, no diarrhea, no vomiting  Genitourinary: no complaints of dysuria, no incontinence, no pelvic pain, no urinary frequency  Musculoskeletal: Hips and back pains  Integumentary: no complaints of skin rash, no itching, no dry skin  Neurological: no complaints of headache, no confusion, no numbness, no dizziness  Past Psychiatric History    Past Psychiatric History: Pt was first diagnosed with a psychiatric illness (Depression and anxiety) in  by PMD  His Sxs were many weeks of daily sadness, low energy, motivation and concentration, feeling overwhelmed, sometimes hopeless and helpless, once had SI, lack of appetite, and insomnia  He also had anxiety with nausea (and sometimes vomiting), fatigue, difficulty concentrating, insomnia, and edginess above and beyond the depression, and panic attacks  Panic Sxs were moderate to severe anxiety, shakes, feeling of fear, tachycardia, palpitations and SOB, Mood and anxiety triggers around that time: mom  of brain and lung CA, then 3 months after that his daughter was diagnosed with Leukemia  (Daughter has since be treated and is in remission  )    PMD prescribed Zoloft and Alprazolam and also immediately referred Pt to psychiatric care  Pt came to [place] [address] outpatient offices and first seen by Cathryn Elizabeth in 2013  She continued his PMD's medicines and tried one switch to Clonazepam but it did not work  Pt was restarted on the Alprazolam      Pt denies h/o manic or psychotic Sxs  Pt had one hospitalization in [place] for depression with SI and verbalized intent but no plan or attempt    Pt denies any h/o self-mutilation/other self harm behaviors, violent behaviors, ECT, or legal or  Hx          Substance Abuse Hx    Substance Abuse History: Pt denies any ETOH use or abuse    He [facility] a teenager but denies any addiction or use/abuse of any other illicit substances  Active Problems    1  Analgesic use (V58 69) (Z79 899)   2  Anxiety (300 00) (F41 9)   3  Avascular necrosis of hip (733 42) (M87 059)   4  Bilateral hip pain (719 45) (M25 551,M25 552)   5  Cerumen impaction (380 4) (H61 20)   6  Chronic pain (338 29) (G89 29)   7  Decreased appetite (783 0) (R63 0)   8  Decreased oral intake (783 9) (R63 8)   9  Depression, major, recurrent, moderate (296 32) (F33 1)   10  Elevated BP (401 9) (I10)   11  Elevated platelet count (676 74) (D47 3)   12  Elevated serum creatinine (790 99) (R79 89)   13  Elevated WBC count (288 60) (D72 829)   14  Fatigue (780 79) (R53 83)   15  Generalized anxiety disorder (300 02) (F41 1)   16  Greater trochanteric bursitis of both hips (726 5) (M70 61,M70 62)   17  Hospital discharge follow-up (V67 59) (Z09)   18  Hyperlipidemia (272 4) (E78 5)   19  Hypovitaminosis D (268 9) (E55 9)   20  Impaired fasting glucose (790 21) (R73 01)   21  Increased platelet count (873 20) (D47 3)   22  Insomnia (780 52) (G47 00)   23  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)   24  Low back pain (724 2) (M54 5)   25  Myofascial pain syndrome (729 1) (M79 1)   26  Nausea (787 02) (R11 0)   27  Obesity (278 00) (E66 9)   28  Osteoarthritis (arthritis due to wear and tear of joints) (715 90) (M19 90)   29  Preop examination (V72 84) (Z01 818)   30  Preoperative clearance (V72 84) (Z01 818)   31  Right calf pain (729 5) (M79 661)   32  Right ear pain (388 70) (H92 01)   33  Right shoulder pain (719 41) (M25 511)   34  S/P hip replacement, right (V43 64) (Z96 641)   35  Screening for lipoid disorders (V77 91) (Z13 220)   36  Unintentional weight loss (783 21) (R63 4)   37  Weight gain (783 1) (R63 5)    Past Medical History    1  History of Aftercare following joint replacement surgery (V54 81) (Z47 1)   2   Anxiety (300 00) (F41 9)   3  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)   4  Osteoarthritis (arthritis due to wear and tear of joints) (715 90) (M19 90)   5  History of Status post left hip replacement (V43 64) (Y77 684)   6  History of Weight gain (783 1) (R63 5)    The active problems and past medical history were reviewed and updated today  Allergies    1  Vicodin TABS    Current Meds   1  ALPRAZolam 1 MG Oral Tablet; Take 1 tablet PO TID prn anxiety; Therapy: 08MIE3800 to (Evaluate:58Vvl5514); Last Rx:54Hxd0222 Ordered   2  Betamethasone Sod Phos & Acet 6 (3-3) MG/ML Injection Suspension; 4 ml; To Be Done:   42QSH6368; Status: HOLD FOR - Administration Ordered   3  Naproxen 500 MG Oral Tablet; TAKE 1 TABLET EVERY 12 HOURS WITH FOOD; Therapy: 78HJY7466 to (Evaluate:00Zgo0558) Recorded   4  TraZODone HCl - 100 MG Oral Tablet; Take 1 1/2 - 2 tabs po qhs prn insomnia; Therapy: 52ABL4426 to (Evaluate:51Msx0196); Last Rx:98Sdg6440 Ordered   5  Venlafaxine HCl  MG Oral Capsule Extended Release 24 Hour; take 1 capsule   daily; Therapy: 11BST7093 to (Evaluate:89Vjn9588)  Requested for: 93KNM2885; Last   Rx:44Ifi4082 Ordered    The medication list was reviewed and updated today  Family Psych History  Mother    1  Family history of lung cancer (V16 1) (Z80 1)   2  Family history of malignant neoplasm of brain (V16 8) (Z80 8)  Father    3  Family history of cerebrovascular accident (V17 1) (Z82 3)   4  Family history of hypertension (V17 49) (Z82 49)  Daughter    5  Family history of leukemia (V16 6) (Z80 6)  Maternal Grandmother    6  Family history of malignant neoplasm of breast (V16 3) (Z80 3)  Maternal Grandfather    7  Family history of myocardial infarction (V17 3) (Z82 49)  Maternal Uncle    8  Family history of Bone cancer    The family history was reviewed and updated today         Social History    · Never a smoker   · No alcohol use   · No drug use   · On disability   · Single   · Two children   · Unemployed, not looking for work  The social history was reviewed and updated today  The social history was reviewed and is unchanged  No changes as of 1/31/2017      History Of Phys/Sex Abuse Or Perpetration    History Of Phys/Sex Abuse or Perpetration: Pt denies any h/o childhood physical or sexual abuse  Education      Pt had h/o speech impediment and received therapy  He reached childhood milestones on time as far as he knows  Graduated high school 3120  No college  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    Pt had h/o speech impediment and received therapy  He reached childhood milestones on time as far as he knows  Graduated high school 8593  No college  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 from 2464-7733     End of [address]    1  Betamethasone Sod Phos & Acet 6 (3-3) MG/ML Injection Suspension; 4 ml; To Be Done:   70OPI6656; Status: HOLD FOR - Administration Ordered    2  Venlafaxine HCl  MG Oral Capsule Extended Release 24 Hour (Effexor XR); take   1 capsule daily; Therapy: 47KZW6591 to (Evaluate:01Apr2017)  Requested for: 57QNH2810; Last   Rx:31Jan2017 Ordered    3  ALPRAZolam 1 MG Oral Tablet; Take 1 tablet PO TID prn anxiety; Therapy: 80NOI2957 to (Evaluate:01Apr2017); Last Rx:31Jan2017 Ordered   4  TraZODone HCl - 100 MG Oral Tablet; Take 1 1/2 - 2 tabs po qhs prn insomnia; Therapy: 84NKW2996 to (Evaluate:01Apr2017)  Requested for: 80PQY6919; Last   Rx:31Jan2017 Ordered    5  Naproxen 500 MG Oral Tablet; TAKE 1 TABLET EVERY 12 HOURS WITH FOOD;    Therapy: 59BFG8420 to (Evaluate:97Oor6640) Recorded    Future Appointments    Date/Time Provider Specialty Site   04/03/2017 02:30 PM DUSTIN Poon Psychiatry [facility]     Signatures   Electronically signed by : DUSTIN Melara; Jan 31 2017  6:05PM EST                       (Author)    Electronically signed by : Delvis Cancino NATHALIE Gonzalez ; Feb 1 2017  9:14AM EST                       (Author)

## 2018-01-12 NOTE — RESULT NOTES
Message   Can you please let pt know that his wbc and kidney function is now within a normal range? His a1c/blood sugar over a 3 month period is also within a normal range  Thank you     Verified Results  (1) CBC/PLT/DIFF 18Apr2016 10:03AM Patience Conway     Test Name Result Flag Reference   WBC COUNT 8 30 Thousand/uL  4 31-10 16   RBC COUNT 4 65 Million/uL  3 88-5 62   HEMOGLOBIN 13 6 g/dL  12 0-17 0   HEMATOCRIT 42 2 %  36 5-49 3   MCV 91 fL  82-98   MCH 29 2 pg  26 8-34 3   MCHC 32 2 g/dL  31 4-37 4   RDW 13 3 %  11 6-15 1   MPV 9 5 fL  8 9-12 7   PLATELET COUNT 056 Thousands/uL  149-390   nRBC AUTOMATED 0 /100 WBCs     NEUTROPHILS RELATIVE PERCENT 60 %  43-75   LYMPHOCYTES RELATIVE PERCENT 31 %  14-44   MONOCYTES RELATIVE PERCENT 8 %  4-12   EOSINOPHILS RELATIVE PERCENT 1 %  0-6   BASOPHILS RELATIVE PERCENT 0 %  0-1   NEUTROPHILS ABSOLUTE COUNT 4 90 Thousands/µL  1 85-7 62   LYMPHOCYTES ABSOLUTE COUNT 2 57 Thousands/µL  0 60-4 47   MONOCYTES ABSOLUTE COUNT 0 68 Thousand/µL  0 17-1 22   EOSINOPHILS ABSOLUTE COUNT 0 11 Thousand/µL  0 00-0 61   BASOPHILS ABSOLUTE COUNT 0 02 Thousands/µL  0 00-0 10     (1) BASIC METABOLIC PROFILE [id number] 10:03AM Patience Conway   National Kidney Disease Education Program recommendations are as follows:  GFR calculation is accurate only with a steady state creatinine  Chronic Kidney disease less than 60 ml/min/1 73 sq  meters  Kidney failure less than 15 ml/min/1 73 sq  meters  Test Name Result Flag Reference   GLUCOSE,RANDM 86 mg/dL     If the patient is fasting, the ADA then defines impaired fasting glucose as > 100 mg/dL and diabetes as > or equal to 123 mg/dL     SODIUM 138 mmol/L  136-145   POTASSIUM 3 9 mmol/L  3 5-5 3   CHLORIDE 99 mmol/L L 100-108   E512   CARBON DIOXIDE 29 mmol/L  21-32   ANION GAP (CALC) 10 mmol/L  4-13   BLOOD UREA NITROGEN 10 mg/dL  5-25   CREATININE 1 12 mg/dL  0 60-1 30   Standardized to IDMS reference method   CALCIUM 9 1 mg/dL 8  3-10 1   eGFR Non-African American      >60 0 ml/min/1 73sq m     (1) HEMOGLOBIN A1C 08Kkt7581 10:03AM Patience Conway   5 7-6 4% impaired fasting glucose  >=6 5% diagnosis of diabetes    Falsely low levels are seen in conditions linked to short RBC life span-  hemolytic anemia, and splenomegaly  Falsely elevated levels are seen in situations where there is an increased production of RBC- receipt of erythropoietin or blood transfusions  Adopted from ADA-Clinical Practice Recommendations     Test Name Result Flag Reference   HEMOGLOBIN A1C 5 3 %  4 0-5 6   EST  AVG   GLUCOSE 105 mg/dl         Signatures   Electronically signed by : Loren Hill MD; Apr 19 2016  8:37AM EST                       (Author)

## 2018-01-12 NOTE — PSYCH
Psych Med Mgmt    Appearance: was calm and cooperative, adequate hygiene and grooming and good eye contact  Observed mood: was dysphoric and anxious  Observed mood: affect was broad, but affect appropriate  Speech: a normal rate and fluent   Normal volume  Thought processes: coherent/organized   No loose associations  Hallucinations: no hallucinations present  Thought Content: no delusions  Abnormal Thoughts: The patient has no suicidal thoughts and no homicidal thoughts  Orientation: The patient is oriented to person, place and time  Recent and Remote Memory: short term memory intact and long term memory intact  Attention Span And Concentration: concentration intact  Insight: Insight intact  Judgment: His judgment was intact  Muscle Strength And Tone  Antalgic gait with limp--Rt side>Lt  Language: no difficulty naming common objects and no difficulty repeating a phrase  Fund of knowledge: Patient displays adequate knowledge of current events, adequate fund of knowledge regarding past history and adequate fund of knowledge regarding vocabulary  The patient is experiencing no localized pain  Treatment Recommendations: Pt is having moderate anxiety, but somewhat improved--I will therefore continue Alprazolam  Mood is still down  Will increase Duloxetine for mood and anxiety  He has been weaned off of Venlafaxine ER  Pt accepts the plan and will pursue psychotherapy  Increase Duloxetine to 60mg (1) cap po qd # 30   Continue:  Alprazolam 1mg (1) tab po tid prn anxiety # 90  Melatonin 3mg (1-2) tabs po qhs prn insomnia # 60  Pt to f/u pain mgt later this month   Return 1 month  Risks, Benefits And Possible Side Effects Of Medications: Risks, benefits, and possible side effects of medications explained to patient and patient verbalizes understanding     Discussed with patient Black Box warning on concurrent use of benzodiazepines and opioid medications including sedation, respiratory depression, coma and death  Patient understands the risk of treatment with benzodiazepines in addition to opioids and wants to continue taking those medications  Discussed with patient the risks of sedation, respiratory depression, impairment of ability to drive and potential for abuse and addiction related to treatment with benzodiazepine medications  The patient understands risk of treatment with benzodiazepine medications, agrees to not drive if feels impaired and agrees to take medications as prescribed  The patient has been filling controlled prescriptions on time as prescribed to [facility] program     He reports normal appetite, decreased energy, no weight change and normal number of sleep hours  Juan Reed is a 40y/o male here for medication review with primary c/o "I don't feel that jittery feeling anymore like I did with the Venlafaxine  Sometimes I feel depressed " Most days of the week he has a down mood with lower energy and motivation and gets little done  He has continued anxiety and worry over his daughter who has h/o leukemia and more recently, a heart condition  However, his appetite has normalized and sleep is improved  He has panic attacks approx 1-2x per day  He presently denies SI, HI, or manic or psychotic Sxs  He reports compliance to his medications without SE  Vitals  Signs   Recorded: 96GTL7281 12:37PM   Heart Rate: 72  Systolic: 438, LUE, Sitting  Diastolic: 92, LUE, Sitting  Height: 5 ft 5 in  Weight: 227 lb   BMI Calculated: 37 77  BSA Calculated: 2 09    Assessment    1  Depression, major, recurrent, moderate (296 32) (F33 1)   2  Generalized anxiety disorder (300 02) (F41 1)   3  Chronic pain (338 29) (G89 29)    Plan    1  DULoxetine HCl - 60 MG Oral Capsule Delayed Release Particles; Take 1   capsule by mouth daily   2  Melatonin 3 MG Oral Tablet; Take 1-2 tabs by mouth nightly, as needed for sleep    3   ALPRAZolam 1 MG Oral Tablet; Take 1 tablet PO TID prn anxiety    Review of Systems    Constitutional: No fever, no chills, feels well, no tiredness, no recent weight gain or loss  Cardiovascular: no complaints of slow or fast heart rate, no chest pain, no palpitations  Respiratory: no complaints of shortness of breath, no wheezing, no dyspnea on exertion  Gastrointestinal: no complaints of abdominal pain, no constipation, no nausea, no diarrhea, no vomiting  Genitourinary: no complaints of dysuria, no incontinence, no pelvic pain, no urinary frequency  Musculoskeletal: Back and leg pains  Neurological: numbness and leg numbness at times--chronic issue since hip replacements per Pt  Past Psychiatric History    Past Psychiatric History: Pt was first diagnosed with a psychiatric illness (Depression and anxiety) in  by PMD  His Sxs were many weeks of daily sadness, low energy, motivation and concentration, feeling overwhelmed, sometimes hopeless and helpless, once had SI, lack of appetite, and insomnia  He also had anxiety with nausea (and sometimes vomiting), fatigue, difficulty concentrating, insomnia, and edginess above and beyond the depression, and panic attacks  Panic Sxs were moderate to severe anxiety, shakes, feeling of fear, tachycardia, palpitations and SOB, Mood and anxiety triggers around that time: mom  of brain and lung CA, then 3 months after that his daughter was diagnosed with Leukemia  (Daughter has since been treated and is in remission  )    PMD prescribed Zoloft and Alprazolam and also immediately referred Pt to psychiatric care  Pt came to [place] [address] outpatient offices and first seen by Lucho Barker in 2013  She continued his PMD's medicines and tried one switch to Clonazepam but it did not work  Pt was restarted on the Alprazolam      Pt denies h/o manic or psychotic Sxs      Pt had one hospitalization in [place] for depression with SI and verbalized intent but no plan or attempt    Pt denies any h/o self-mutilation/other self harm behaviors, violent behaviors, ECT, or legal or  Hx  Pt tried: Zoloft, Venlafaxine ER      Substance Abuse Hx    Substance Abuse History: Pt denies any ETOH use or abuse    He tried [facility] as a teenager but denies any addiction or use/abuse of any other illicit substances  Active Problems    1  Analgesic use (V58 69) (Z79 899)   2  History of Anxiety (300 00) (F41 9)   3  Avascular necrosis of hip (733 42) (M87 059)   4  Bilateral hip pain (719 45) (M25 551,M25 552)   5  Cerumen impaction (380 4) (H61 20)   6  Chronic pain (338 29) (G89 29)   7  Decreased appetite (783 0) (R63 0)   8  Decreased oral intake (783 9) (R63 8)   9  Depression, major, recurrent, moderate (296 32) (F33 1)   10  Elevated BP (401 9) (I10)   11  Elevated platelet count (584 07) (D47 3)   12  Elevated serum creatinine (790 99) (R79 89)   13  Elevated WBC count (288 60) (D72 829)   14  Fatigue (780 79) (R53 83)   15  Generalized anxiety disorder (300 02) (F41 1)   16  Greater trochanteric bursitis of both hips (726 5) (M70 61,M70 62)   17  Hospital discharge follow-up (V67 59) (Z09)   18  Hyperlipidemia (272 4) (E78 5)   19  Hypovitaminosis D (268 9) (E55 9)   20  Impaired fasting glucose (790 21) (R73 01)   21  Increased platelet count (883 88) (D47 3)   22  Insomnia (780 52) (G47 00)   23  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)   24  Low back pain (724 2) (M54 5)   25  Myofascial pain syndrome (729 1) (M79 1)   26  Nausea (787 02) (R11 0)   27  Obesity (278 00) (E66 9)   28  Osteoarthritis (arthritis due to wear and tear of joints) (715 90) (M19 90)   29  Preop examination (V72 84) (Z01 818)   30  Preoperative clearance (V72 84) (Z01 818)   31  Right calf pain (729 5) (M79 661)   32  Right ear pain (388 70) (H92 01)   33  Right shoulder pain (719 41) (M25 511)   34  S/P hip replacement, right (V43 64) (Z96 641)   35   Screening for lipoid disorders (V77 91) (Z13 220)   36  Unintentional weight loss (783 21) (R63 4)   37  Weight gain (783 1) (R63 5)    Past Medical History    1  History of Aftercare following joint replacement surgery (V54 81) (Z47 1)   2  History of Anxiety (300 00) (F41 9)   3  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)   4  Osteoarthritis (arthritis due to wear and tear of joints) (715 90) (M19 90)   5  History of Status post left hip replacement (V43 64) (P97 290)   6  History of Weight gain (783 1) (R63 5)    The active problems and past medical history were reviewed and updated today  Allergies    1  Vicodin TABS    Current Meds   1  ALPRAZolam 1 MG Oral Tablet; Take 1 tablet PO TID prn anxiety; Therapy: 69UIH5584 to (Evaluate:65Fec0690); Last Rx:03Apr2017 Ordered   2  Betamethasone Sod Phos & Acet 6 (3-3) MG/ML Injection Suspension; 4 ml; To Be Done:   14EQD1688; Status: HOLD FOR - Administration Ordered   3  DULoxetine HCl - 30 MG Oral Capsule Delayed Release Particles; Start in 1 week: Take   1 cap by mouth daily; Therapy: 54BCG5057 to (Carolyn Baez)  Requested for: 03Apr2017; Last   Rx:03Apr2017 Ordered   4  Melatonin 3 MG Oral Tablet; Take 1-2 tabs by mouth nightly, as needed for sleep; Therapy: 32QON6852 to (Carolyn Baez)  Requested for: 03Apr2017; Last   Rx:03Apr2017 Ordered   5  Naproxen 500 MG Oral Tablet; TAKE 1 TABLET EVERY 12 HOURS WITH FOOD; Therapy: 54SAZ6602 to (Evaluate:64Nuw2292) Recorded    The medication list was reviewed and updated today  Family Psych History  Mother    1  Family history of lung cancer (V16 1) (Z80 1)   2  Family history of malignant neoplasm of brain (V16 8) (Z80 8)  Father    3  Family history of cerebrovascular accident (V17 1) (Z82 3)   4  Family history of hypertension (V17 49) (Z82 49)  Daughter    5  Family history of leukemia (V16 6) (Z80 6)  Maternal Grandmother    6   Family history of malignant neoplasm of breast (V16 3) (Z80 3)  Maternal Grandfather    7  Family history of myocardial infarction (V17 3) (Z82 49)  Maternal Uncle    8  Family history of Bone cancer    The family history was reviewed and updated today  Social History    · Never a smoker   · No alcohol use   · No drug use   · On disability   · Single   · Two children   · Unemployed, not looking for work  The social history was reviewed and updated today  The social history was reviewed and is unchanged  No changes as of 5/3/2017      History Of Phys/Sex Abuse Or Perpetration    History Of Phys/Sex Abuse or Perpetration: Pt denies any h/o childhood physical or sexual abuse  Education          Pt had h/o speech impediment and received therapy  He reached childhood milestones on time as far as he knows  Graduated high school [employer]  No college  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 from [id number]    Pt had h/o speech impediment and received therapy  He reached childhood milestones on time as far as he knows  Graduated high school [employer]24  No college  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 [id number]     End of [address]    1  Betamethasone Sod Phos & Acet 6 (3-3) MG/ML Injection Suspension; 4 ml; To Be Done:   81VPH7588; Status: HOLD FOR - Administration Ordered    2  DULoxetine HCl - 60 MG Oral Capsule Delayed Release Particles; Take 1 capsule by   mouth daily; Therapy: 31ERA2366 to (Evaluate:02Jun2017)  Requested for: 37AMJ3633; Last   Rx:03May2017 Ordered   3  Melatonin 3 MG Oral Tablet; Take 1-2 tabs by mouth nightly, as needed for sleep; Therapy: 18KXX7179 to (Evaluate:02Jun2017)  Requested for: 39WBG6710; Last   Rx:03May2017 Ordered    4  ALPRAZolam 1 MG Oral Tablet; Take 1 tablet PO TID prn anxiety; Therapy: 16UGY0974 to (Evaluate:02Jun2017); Last UD:25SSK0123 Ordered    5  Naproxen 500 MG Oral Tablet; TAKE 1 TABLET EVERY 12 HOURS WITH FOOD;    Therapy: 90SPS8612 to (Evaluate:19Orf1740) Recorded    Signatures   Electronically signed by : DUSTIN Ornelas; May  3 2017 12:43PM EST                       (Author)    Electronically signed by : NATHALIE Bradley ; May  3 2017 12:44PM EST                       (Author)

## 2018-01-13 NOTE — RESULT NOTES
Message   Can you please let patient know that his blood work showed the following:   History total cholesterol was 256, his bad cholesterol/LDL was 184, his good/HDL was 39  I recommend that he work on improving his good cholesterol, including having a Mediterranean diet including olive oil, and , salmon, avocados as well as weight loss  In addition, his WBC was elevated at 11 26, this is usually reflective of an acute infection or recent infection  I would like to repeat this in 4 weeks  I will print out the bw script  If the patient is having any symptoms I advised that he come in for further evaluation  In addition his blood sugar was minimally elevated at 110  I would like to check his A1c  I will print this out as well  He can check this with his other blood work in one month  Also his kidney function was minimally elevated at 1 33, normal is 1 30  We will monitor this   Finally his vitamin D was low at 21  I will call in an Rx for vitamin D supplementation  His thyroid, liver were within the normal range   Thank you   I did get a review results for c-reactive protein which was elevated, can you please have patient call the doctor who ordered this test so they may review it with him?    Thank you        Verified Results  (1) LIPID PANEL FASTING W DIRECT LDL REFLEX [id number] 11:00AM Patience Conway   Triglyceride:         Normal              <150 mg/dl       Borderline High    150-199 mg/dl       High               200-499 mg/dl       Very High          >499 mg/dl  Cholesterol:         Desirable        <200 mg/dl      Borderline High  200-239 mg/dl      High             >239 mg/dl  HDL Cholesterol:        High    >59 mg/dL      Low     <41 mg/dL  LDL Cholesterol:        Optimal          <100 mg/dl         Near Optimal     100-129 mg/dl        Above Optimal          Borderline High   130-159 mg/dl          High              160-189 mg/dl          Very High        >189 mg/dl  LDL CALCULATED:    This screening LDL is a calculated result  It does not have the accuracy of the Direct Measured LDL in the monitoring of patients with hyperlipidemia and/or statin therapy  Direct Measure LDL (FIJ115) must be ordered separately in these patients  Test Name Result Flag Reference   CHOLESTEROL 256 mg/dL H    LDL CHOLESTEROL CALCULATED 184 mg/dL H 0-100   TRIGLYCERIDES 164 mg/dL H <=150   HDL,DIRECT 39 mg/dL L 40-60     (1) COMPREHENSIVE METABOLIC PANEL 27ZQD6255 15:15HW Patience Conway   National Kidney Disease Education Program recommendations are as follows:  GFR calculation is accurate only with a steady state creatinine  Chronic Kidney disease less than 60 ml/min/1 73 sq  meters  Kidney failure less than 15 ml/min/1 73 sq  meters  Test Name Result Flag Reference   GLUCOSE,RANDM 110 mg/dL     SODIUM 138 mmol/L  136-145   POTASSIUM 4 7 mmol/L  3 5-5 3   CHLORIDE 102 mmol/L  100-108   CARBON DIOXIDE 28 mmol/L  21-32   ANION GAP (CALC) 8 mmol/L  4-13   BLOOD UREA NITROGEN 16 mg/dL  5-25   CREATININE 1 33 mg/dL H 0 60-1 30   CALCIUM 8 8 mg/dL  8 3-10 1   BILI, TOTAL 0 43 mg/dL  0 20-1 00   ALK PHOSPHATAS 94 U/L     ALT (SGPT) 53 U/L  12-78   AST(SGOT) 19 U/L  5-45   ALBUMIN 4 6 g/dL  3 5-5 0   TOTAL PROTEIN 8 3 g/dL H 6 4-8 2   eGFR Non-African American      >60 0 ml/min/1 73sq m     (1) TSH WITH FT4 REFLEX [id number] 11:00AM Patience Conway   Patients undergoing fluorescein dye angiography may retain small amounts of fluorescein in the body for 48-72 hours post procedure  Samples containing fluorescein can produce falsely depressed TSH values  If the patient had this procedure,a specimen should be resubmitted post fluorescein clearance       Test Name Result Flag Reference   TSH 1 020 uIU/mL  0 358-3 740     (1) VITAMIN D 25-HYDROXY 67NWI1545 11:00AM Patience Conway     Test Name Result Flag Reference   VIT D 25-HYDROX 21 0 ng/mL L 30 0-100 0     (1) CBC/PLT/DIFF [id number] 11: Frankey Mis     Test Name Result Flag Reference   WBC COUNT 11 26 Thousand/uL H 4 31-10 16   RBC COUNT 4 72 Million/uL  3 88-5 62   HEMOGLOBIN 14 1 g/dL  12 0-17 0   HEMATOCRIT 43 4 %  36 5-49 3   MCV 92 fL  82-98   MCH 29 9 pg  26 8-34 3   MCHC 32 5 g/dL  31 4-37 4   RDW 13 5 %  11 6-15 1   MPV 9 5 fL  8 9-12 7   PLATELET COUNT 876 Thousands/uL H 149-390   nRBC AUTOMATED 0 /100 WBCs     NEUTROPHILS RELATIVE PERCENT 78 % H 43-75   LYMPHOCYTES RELATIVE PERCENT 16 %  14-44   MONOCYTES RELATIVE PERCENT 6 %  4-12   EOSINOPHILS RELATIVE PERCENT 0 %  0-6   BASOPHILS RELATIVE PERCENT 0 %  0-1   NEUTROPHILS ABSOLUTE COUNT 8 68 Thousands/µL H 1 85-7 62   LYMPHOCYTES ABSOLUTE COUNT 1 81 Thousands/µL  0 60-4 47   MONOCYTES ABSOLUTE COUNT 0 70 Thousand/µL  0 17-1 22   EOSINOPHILS ABSOLUTE COUNT 0 03 Thousand/µL  0 00-0 61   BASOPHILS ABSOLUTE COUNT 0 01 Thousands/µL  0 00-0 10       Signatures   Electronically signed by : Armando Harrison MD; Mar 19 2016  7:58AM EST                       (Author)

## 2018-01-14 VITALS — BODY MASS INDEX: 36.82 KG/M2 | WEIGHT: 221 LBS | HEIGHT: 65 IN | HEART RATE: 92 BPM

## 2018-01-14 VITALS
HEART RATE: 72 BPM | HEIGHT: 65 IN | WEIGHT: 227 LBS | BODY MASS INDEX: 37.82 KG/M2 | SYSTOLIC BLOOD PRESSURE: 135 MMHG | DIASTOLIC BLOOD PRESSURE: 92 MMHG
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
## 2018-01-15 NOTE — MISCELLANEOUS
Provider Comments  Provider Comments:   Patient was a no show for today's scheduled appointment at 9:30 AM for a 1 month follow up        Signatures   Electronically signed by : Roslyn Patel MD; Jun 20 2016  9:47AM EST                       (Author)

## 2018-01-16 NOTE — MISCELLANEOUS
Message   Recorded as Task   Date: 10/02/2017 01:31 PM, Created By: Hemal Salvador   Task Name: Care Coordination   Assigned To: Cleve Oppenheim   Regarding Patient: Raad Tomlinson, Status: Active   CommentDolucasn Grayson - 02 Oct 2017 1:31 PM     TASK CREATED  Caller: Sister, Spouse; Personal  Ethyl Lusher sister called because a letter was sent to him, but she wants to inform you that he passed away  Jaskaran Reeder - 55 Oct 2017 4:07 PM     TASK REASSIGNED: Previously Assigned To Jaskaran Reeder   I attempted to contact Pt's given emergency  Debbie Chance to offer my condolences but it was a wrong #        Signatures   Electronically signed by : DUSTIN Quinn; Oct  3 2017  1:09PM EST                       (Author)

## 2018-01-16 NOTE — PSYCH
Psych Med Mgmt    Appearance: was calm and cooperative, adequate hygiene and grooming and good eye contact  Observed mood: depressed and anxious  Observed mood: affect was constricted  Speech: a normal rate and fluent   Normal volume  Thought processes: coherent/organized  Hallucinations: no hallucinations present  Thought Content: no delusions   Ruminative  Abnormal Thoughts: The patient has no suicidal thoughts and no homicidal thoughts  Orientation: The patient is oriented to person, place and time  Recent and Remote Memory: short term memory intact and long term memory intact  Attention Span And Concentration: concentration intact  Judgment: His judgment was intact  Muscle Strength And Tone  Steady, stable, Rt foot turns out  Language: no difficulty naming common objects and no difficulty repeating a phrase  Fund of knowledge: Patient displays adequate knowledge of current events and adequate fund of knowledge regarding vocabulary  The patient is experiencing moderate to severe pain  On a scale of 0 - 10 the pain severity is a 5  Hips and back  Treatment Recommendations: See plan  Risks, Benefits And Possible Side Effects Of Medications: Risks, benefits, and possible side effects of medications explained to patient and patient verbalizes understanding  Discussed with patient Black Box warning on concurrent use of benzodiazepines and opioid medications including sedation, respiratory depression, coma and death  Patient understands the risk of treatment with benzodiazepines in addition to opioids and wants to continue taking those medications  Discussed with patient the risks of sedation, respiratory depression, impairment of ability to drive and potential for abuse and addiction related to treatment with benzodiazepine medications   The patient understands risk of treatment with benzodiazepine medications, agrees to not drive if feels impaired and agrees to take medications as prescribed  The patient has been filling controlled prescriptions on time as prescribed to [facility]    Pt states he is no longer taking the opiate medicine   He reports normal appetite, normal energy level, no weight change and normal number of sleep hours  Elizabeth De Jesus is a 37y/o male here for medication review with primary c/o "No more of that Ambien, I was sleepwalking " Pt was switched from Trazodone to Zolpidem because the former Rx was not helping and he had taken 10mg tab of Zolpidem with benefit in the past  However he only just developed the complicated sleep behaviors and will try Trazodone again, but at a higher dose  He continues having significant anxiety and panic attacks--Alprazolam helps  The depression has reduced since the increase in Sertraline, but is still significant  He worries about his family's h/o cancer and chronic pains  He presently denies SI, HI, or manic or psychotic Sxs  Results/Data  (1) PLATELET COUNT [id number] 11:19AM Patience Conway     Test Name Result Flag Reference   PLATELET COUNT 704 Thousands/uL  149-390   MPV 9 4 fL  8 9-12 7     PHQ-9 Adult Depression Screening 36VLD7187 10:22AM Humza Davis     Test Name Result Flag Reference   PHQ-9 Adult Depression Score 19     Over the last two weeks, how often have you been bothered by any of the following problems?   Little interest or pleasure in doing things: More than half the days - 2  Feeling down, depressed, or hopeless: More than half the days - 2  Trouble falling or staying asleep, or sleeping too much: Nearly every day - 3  Feeling tired or having little energy: Nearly every day - 3  Poor appetite or over eating: More than half the days - 2  Feeling bad about yourself - or that you are a failure or have let yourself or your family down: Nearly every day - 3  Trouble concentrating on things, such as reading the newspaper or watching television: More than half the days - 2  Moving or speaking so slowly that other people could have noticed  Or the opposite -  being so fidgety or restless that you have been moving around a lot more than usual: More than half the days - 2  Thoughts that you would be better off dead, or of hurting yourself in some way: Not at all - 0   PHQ-9 Adult Depression Screening Positive     PHQ-9 Difficulty Level Somewhat difficult     PHQ-9 Severity      Moderately Severe Depression       Vitals  Signs   Recorded: 43Bol2468 01:09PM   Heart Rate: 99  Height: 5 ft 5 in  Weight: 210 lb   BMI Calculated: 34 95  BSA Calculated: 2 02    Assessment    1  Depression, major, recurrent, moderate (296 32) (F33 1)   2  Generalized anxiety disorder (300 02) (F41 1)   3  Chronic pain (338 29) (G89 29)    Plan    1  From  Sertraline HCl - 100 MG Oral Tablet Take 2 tabs po qd To Sertraline HCl   - 100 MG Oral Tablet (Zoloft) Wean off: Take 1 1/2  tabs by mouth each day for 1 week,   then 1 tab each day for 1 week, then 1/2 tab each day for 1 week, then Stop    2  Venlafaxine HCl ER 75 MG Oral Capsule Extended Release 24 Hour; To start in 2   weeks: Take 1 cap po qd x 1 week, then increase to 2 caps po qd    3  TraZODone HCl - 100 MG Oral Tablet; Take 1 1/2 - 2 tabs po qhs prn insomnia   4  From  ALPRAZolam 1 MG Oral Tablet 1 TABLET PO TID PRN ANXIETY To   ALPRAZolam 1 MG Oral Tablet Take 1 tablet PO TID prn anxiety    Discussed his Sxs, Tx options and imported test results  Pt has ongoing moderate anxiety and depression and SE from Zolpidem  I am formally discontinuing the sleep medicine and Pt is interested in restarting Trazodone at a higher dose  He is also willing to try an alternative to Gage Walker is not working anymore  I will wean him off the Sertraline  I explained the risks, benefits and SE of Venlafaxine ER  I recommended psychotherapy and Pt is interested in individual counseling  Pt verbalized understanding and acceptance of the plan    Wean off Sertraline 100mg (1 1/2) tabs po qd x 1 week, then (1) tab po qd x 1 week, then (1/2) tab po qd x 1 week, then stop--Pt has pills from a prior Rx  Start in 2 weeks:  Venlafaxine ER 75mg (1) cap po qd x 1 week, then (2) tabs po qd # 21  Restart Trazodone 100mg (1 1/2- 2) tabs po qhs prn insomnia # 30 R1  Continue:  Alprazolam 1mg (1) tab po tid prn anxiety # 90 R1  Return 4-6 weeks, the sooner available appt, call sooner prn  Review of Systems    Constitutional: No fever, no chills, feels well, no tiredness, no recent weight gain or loss  Cardiovascular: no complaints of slow or fast heart rate, no chest pain, no palpitations  Respiratory: no complaints of shortness of breath, no wheezing, no dyspnea on exertion  Gastrointestinal: no complaints of abdominal pain, no constipation, no nausea, no diarrhea, no vomiting  Genitourinary: no complaints of dysuria, no incontinence, no pelvic pain, no urinary frequency  Musculoskeletal: no complaints of arthralgia, no myalgias, no limb pain, no joint stiffness  Integumentary: no complaints of skin rash, no itching, no dry skin  Neurological: no complaints of headache, no confusion, no numbness, no dizziness  Past Psychiatric History    Past Psychiatric History: Pt was first diagnosed with a psychiatric illness (Depression and anxiety) in  by PMD  His Sxs were many weeks of daily sadness, low energy, motivation and concentration, feeling overwhelmed, sometimes hopeless and helpless, once had SI, lack of appetite, and insomnia  He also had anxiety with nausea (and sometimes vomiting), fatigue, difficulty concentrating, insomnia, and edginess above and beyond the depression, and panic attacks  Panic Sxs were moderate to severe anxiety, shakes, feeling of fear, tachycardia, palpitations and SOB, Mood and anxiety triggers around that time: mom  of brain and lung CA, then 3 months after that his daughter was diagnosed with Leukemia   (Daughter has since be treated and is in remission  )    PMD prescribed Zoloft and Alprazolam and also immediately referred Pt to psychiatric care  Pt came to [place] [address] outpatient offices and first seen by Antione Moreira in approx 2013  She continued his PMD's medicines and tried one switch to Clonazepam but it did not work  Pt was restarted on the Alprazolam      Pt denies h/o manic or psychotic Sxs  Pt had one hospitalization in [place] for depression with SI and verbalized intent but no plan or attempt    Pt denies any h/o self-mutilation/other self harm behaviors, violent behaviors, ECT, or legal or  Hx  Substance Abuse Hx    Substance Abuse History: Pt denies any ETOH use or abuse    He tried [facility] as a teenager but denies any addiction or use/abuse of any other illicit substances  Active Problems    1  Analgesic use (V58 69) (Z79 899)   2  Anxiety (300 00) (F41 9)   3  Avascular necrosis of hip (733 42) (M87 059)   4  Bilateral hip pain (719 45) (M25 551,M25 552)   5  Cerumen impaction (380 4) (H61 20)   6  Chronic pain (338 29) (G89 29)   7  Decreased appetite (783 0) (R63 0)   8  Decreased oral intake (783 9) (R63 8)   9  Depression, major, recurrent, moderate (296 32) (F33 1)   10  Elevated BP (401 9) (I10)   11  Elevated platelet count (006 39) (D47 3)   12  Elevated serum creatinine (790 99) (R79 89)   13  Elevated WBC count (288 60) (D72 829)   14  Fatigue (780 79) (R53 83)   15  Generalized anxiety disorder (300 02) (F41 1)   16  Greater trochanteric bursitis of both hips (726 5) (M70 61,M70 62)   17  Hospital discharge follow-up (V67 59) (Z09)   18  Hyperlipidemia (272 4) (E78 5)   19  Hypovitaminosis D (268 9) (E55 9)   20  Impaired fasting glucose (790 21) (R73 01)   21  Increased platelet count (345 35) (D47 3)   22  Insomnia (780 52) (G47 00)   23  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)   24  Low back pain (724 2) (M54 5)   25   Myofascial pain syndrome (729 1) (M79 1) 26  Nausea (787 02) (R11 0)   27  Obesity (278 00) (E66 9)   28  Osteoarthritis (arthritis due to wear and tear of joints) (715 90) (M19 90)   29  Preop examination (V72 84) (Z01 818)   30  Preoperative clearance (V72 84) (Z01 818)   31  Right calf pain (729 5) (M79 661)   32  Right ear pain (388 70) (H92 01)   33  Right shoulder pain (719 41) (M25 511)   34  S/P hip replacement, right (V43 64) (Z96 641)   35  Screening for lipoid disorders (V77 91) (Z13 220)   36  Unintentional weight loss (783 21) (R63 4)   37  Weight gain (783 1) (R63 5)    Past Medical History    1  History of Aftercare following joint replacement surgery (V54 81) (Z47 1)   2  Anxiety (300 00) (F41 9)   3  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 10)   4  Osteoarthritis (arthritis due to wear and tear of joints) (715 90) (M19 90)   5  History of Status post left hip replacement (V43 64) (G22 608)   6  History of Weight gain (783 1) (R63 5)    The active problems and past medical history were reviewed and updated today  Allergies    1  Vicodin TABS    Current Meds   1  ALPRAZolam 1 MG Oral Tablet; 1 TABLET PO TID PRN ANXIETY; Therapy: 36XYV8445 to (Evaluate:93Joj0072); Last Rx:20Oct2016 Ordered   2  Betamethasone Sod Phos & Acet 6 (3-3) MG/ML Injection Suspension; 4 ml; To Be Done:   68FIP2227; Status: HOLD FOR - Administration Ordered   3  Naproxen 500 MG Oral Tablet; TAKE 1 TABLET EVERY 12 HOURS WITH FOOD; Therapy: 19PQD8055 to (Evaluate:11Swh9833) Recorded    The medication list was reviewed and updated today  Family Psych History  Mother    1  Family history of lung cancer (V16 1) (Z80 1)   2  Family history of malignant neoplasm of brain (V16 8) (Z80 8)  Father    3  Family history of cerebrovascular accident (V17 1) (Z82 3)   4  Family history of hypertension (V17 49) (Z82 49)  Daughter    5  Family history of leukemia (V16 6) (Z80 6)  Maternal Grandmother    6   Family history of malignant neoplasm of breast (V16 3) (Z80 3)  Maternal Grandfather    7  Family history of myocardial infarction (V17 3) (Z82 49)  Maternal Uncle    8  Family history of Bone cancer    The family history was reviewed and updated today  Social History    · Never a smoker   · No alcohol use   · No drug use   · On disability   · Single   · Two children   · Unemployed, not looking for work  The social history was reviewed and updated today  The social history was reviewed and is unchanged  No changes as of 12/15/2016      History Of Phys/Sex Abuse Or Perpetration    History Of Phys/Sex Abuse or Perpetration: Pt denies any h/o childhood physical or sexual abuse  Education    Pt had h/o speech impediment and received therapy  He reached childhood milestones on time as far as he knows  Graduated high school 7025  No college  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 from [id number]     End of [address]    1  Sertraline HCl - 100 MG Oral Tablet (Zoloft); Wean off: Take 1 1/2  tabs by mouth each day   for 1 week, then 1 tab each day for 1 week, then 1/2 tab each day for 1 week, then Stop; Therapy: 62WPB5357 to (Evaluate:36Kcg2355)  Requested for: 34RKT1496; Last   Rx:80Xxo9310 Ordered    2  Betamethasone Sod Phos & Acet 6 (3-3) MG/ML Injection Suspension; 4 ml; To Be Done:   75TQB0996; Status: HOLD FOR - Administration Ordered    3  Venlafaxine HCl ER 75 MG Oral Capsule Extended Release 24 Hour; To start in 2 weeks: Take 1 cap po qd x 1 week, then increase to 2 caps po qd; Therapy: 29XWI4598 to (Evaluate:63Sgu5355); Last Rx:14Rvq2274 Ordered    4  ALPRAZolam 1 MG Oral Tablet; Take 1 tablet PO TID prn anxiety; Therapy: 71QSU4531 to (Evaluate:11Cye9214); Last Rx:62Wux5366 Ordered   5  TraZODone HCl - 100 MG Oral Tablet; Take 1 1/2 - 2 tabs po qhs prn insomnia; Therapy: 85RTD1318 to (Evaluate:36Lod1307); Last Rx:51Xzb7602 Ordered    6  Naproxen 500 MG Oral Tablet; TAKE 1 TABLET EVERY 12 HOURS WITH FOOD;    Therapy: 61NME5249 to (Evaluate:94Pxh1585) Recorded    Future Appointments    Date/Time Provider Specialty Site   01/20/2017 09:30 AM DUSTIN [facility] [address]   Electronically signed by : DUSTIN Stein; Dec 15 2016  1:15PM EST                       (Author)    Electronically signed by : NATHALIE Edwards ; Dec 15 2016  2:04PM EST                       (Author)

## 2018-01-16 NOTE — RESULT NOTES
Verified Results  (1) VITAMIN B6 55Xln1478 09:19AM Patience Conway     Test Name Result Flag Reference   VITAMIN B6 18 4 ug/L  5 3 - 46 7   Performed at:  [address]  [id number]  : Altaf Ruggiero MD, Phone:  [phone]

## 2018-01-16 NOTE — MISCELLANEOUS
Provider Comments  Provider Comments:   Patient was a no show for today's scheduled appointment at 10:30 AM for a discussion and allergies        Signatures   Electronically signed by : Donta Mallory MD; Mar  8 2016 12:59PM EST                       (Author)

## 2018-01-16 NOTE — PROGRESS NOTES
Assessment    1  Bilateral hip pain (719 45) (M25 551,M25 552)   2  Generalized anxiety disorder (300 02) (F41 1)   3  Elevated BP (796 2) (I10)    Plan  Avascular necrosis of hip, Bilateral hip pain    · 3 [facility] Saw TAMAYO  (Anesthesiology) Physician Referral  Consult  Status: Active   Requested for: 14VTQ9367  are Referring to a non- Preferred Provider : Insurance Coverage  Care Summary provided  : Yes  Bilateral hip pain    · 2 - [facility] (ANESTHESIOLOGY ) Physician Referral  Consult  Status: Active   Requested for: 18BOF1053  Care Summary provided  : Yes  Fatigue    · (1) CBC/PLT/DIFF; Status:Active; Requested for:15Jan2016;    · (1) COMPREHENSIVE METABOLIC PANEL; Status:Active; Requested for:15Jan2016;    · (1) TSH WITH FT4 REFLEX; Status:Active; Requested for:15Jan2016;    · (1) VITAMIN D 25-HYDROXY; Status:Active; Requested for:15Jan2016;   Screening for lipoid disorders    · (1) LIPID PANEL FASTING W DIRECT LDL REFLEX; Status:Active; Requested  for:15Jan2016;     Discussion/Summary    75-year-old male here for f/u of     B/L hip pain, i have provided another referral for pain specialist and recommended he f/u with pain mgmt and ortho  I have extensively discussed that I am unable to prescribe any narcotic pain Rx  Pt understands  Pt denies use of any illegal substance and states he has drug testing done done by his grandmother? Re  anxiety, pt will f/u with psychiatry and schedule appt with therapist    Re  elevated bp, initially elevated, upon recheck, decreased minimally  Will monitor    follow up in 1 month    I spent 25 min with the pt with > 50% of the time spent on counseling  The treatment plan was reviewed with the patient/guardian  The patient/guardian understands and agrees with the treatment plan   The patient was counseled regarding instructions for management, prognosis, patient and family education, impressions        Chief Complaint  pt here for a fu appt      History of Present Illness  77-year-old m here for followup of bilateral hip pain secondary to avascular necrosis  Pt states he was seeing dr Dora Miranda , last appt was in November  Pt does not offer this on his own  I discussed with pt re  ER report from December that noted he called the police that his oxycodone bottle was missing  I asked patient who prescribed oxycodone  He states he was seeing Dr Hannah Chavez and did not tell me   when he was in [facility], dr Dora Miranda office called and pt's grandmother may have told dr Dora Miranda that he may be abusing the meds, patient was discharged from the practice  in addition, asked patient about ER report that mentioned police discovered heroine bags in his truck  Pt states they were old and used it for pain 3 yrs ago    saw dr Malachi Dickerson on 12/7 , will schedule appt  trying to make appt to see therapist  had injection by ortho in B/L hips, did not help  called pain mgmt, does not take pt's ins  taking ibuprofen      Review of Systems    Constitutional: no fever and no chills  Musculoskeletal: arthralgias, but as noted in HPI  Psychiatric: anxiety, but as noted in HPI  Active Problems    1  Analgesic use (V58 69) (Z79 899)   2  Anxiety (300 00) (F41 9)   3  Avascular necrosis of hip (733 42) (M87 059)   4  Bilateral hip pain (719 45) (M25 551,M25 552)   5  Cerumen impaction (380 4) (H61 20)   6  Chronic pain (338 29) (G89 29)   7  Depression, major, recurrent, moderate (296 32) (F33 1)   8  Elevated BP (796 2) (I10)   9  Fatigue (780 79) (R53 83)   10  Generalized anxiety disorder (300 02) (F41 1)   11  Greater trochanteric bursitis of both hips (726 5) (M70 62,M70 61)   12  Hospital discharge follow-up (V67 59) (Z09)   13  Insomnia (780 52) (G47 00)   14  History of Geht-Qwpyo-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 10)   15  Low back pain (724 2) (M54 5)   16  Myofascial pain syndrome (729 1) (M79 1)   17  Obesity (278 00) (E66 9)   18   Osteoarthritis (arthritis due to wear and tear of joints) (715 90) (M19 90)   19  Preop examination (V72 84) (Z01 818)   20  Preoperative clearance (V72 84) (Z01 818)   21  Right ear pain (388 70) (H92 01)   22  Screening for lipoid disorders (V77 91) (Z13 220)   23  Weight gain (783 1) (R63 5)    Past Medical History    1  History of Aftercare following joint replacement surgery (V54 81) (Z47 1)   2  Anxiety (300 00) (F41 9)   3  History of Ombh-Ivjhx-Hemyczg disease (732 1) (M91 10)   4  Osteoarthritis (arthritis due to wear and tear of joints) (715 90) (M19 90)   5  History of Status post left hip replacement (V43 64) (A45 919)    Surgical History    1  History of Reported Hx Of Hip Replacement - Left Side   2  History of Tonsillectomy    Family History    1  Family history of lung cancer (V16 1) (Z80 1)   2  Family history of malignant neoplasm of brain (V16 8) (Z80 8)    3  Family history of cerebrovascular accident (V17 1) (Z82 3)   4  Family history of hypertension (V17 49) (Z82 49)    5  Family history of leukemia (V16 6) (Z80 6)    6  Family history of myocardial infarction (V17 3) (Z82 49)    Social History    · Never a smoker   · No alcohol use   · No drug use   · Single   · Two children   · Unemployed, not looking for work    Current Meds   1  Betamethasone Sod Phos & Acet 6 (3-3) MG/ML Injection Suspension; 4 ml; To Be Done:   35UCR0492; Status: HOLD FOR - Administration Ordered   2  ClonazePAM 1 MG Oral Tablet; 1 tab PO TID prn anxiety; Therapy: 31BZW3365 to (Evaluate:79Ene1484); Last Rx:53Rjp6397 Ordered   3  Sertraline HCl - 100 MG Oral Tablet; TAKE 1 and one=half  TABLET DAILY; Therapy: 14HDH5550 to (Kathleen Verdin)  Requested for: 28Acq8662; Last   Rx:18Sqp6878 Ordered    Allergies    1   Vicodin TABS    Vitals  Vital Signs [Data Includes: Current Encounter]    Recorded: [id number] 01:06PM Recorded: 11CLC4026 12:23PM   Temperature  97 3 F   Heart Rate  88   Respiration  16   Systolic 382 131   Diastolic 88 90   Height  5 ft 5 in Weight  231 lb 8 0 oz   BMI Calculated  38 52   BSA Calculated  2 11     Physical Exam    Constitutional   General appearance: No acute distress, well appearing and well nourished  Ears, Nose, Mouth, and Throat   Oropharynx: Normal with no erythema, edema, exudate or lesions  Pulmonary   Respiratory effort: No increased work of breathing or signs of respiratory distress  Auscultation of lungs: Clear to auscultation, equal breath sounds bilaterally, no wheezes, no rales, no rhonci  Cardiovascular   Auscultation of heart: Normal rate and rhythm, normal S1 and S2, without murmurs  Lymphatic   Palpation of lymph nodes in neck: No lymphadenopathy  Psychiatric   Orientation to person, place and time: Normal     Mood and affect: Normal          Future Appointments    Date/Time Provider Specialty Site   03/16/2016 09:00 AM Ellyn Klinefelter, MD Family Medicine [address] [place] [address]   03/01/2016 09:40 AM Cheryle Ek, M D  Orthopedic Surgery [facility] SPECIALISTS   01/29/2016 10:00 AM NATHALIE Melvin   [facility]     Signatures   Electronically signed by : Mirela Holguin MD; Jan 16 2016  5:09PM EST                       (Author)
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
## 2018-01-18 NOTE — MISCELLANEOUS
Message   Recorded as Task   Date: 09/01/2016 12:30 PM, Created By: Stevenson Dixon   Task Name: Med Renewal Request   Assigned To: Anabella Mancia   Regarding Patient: Radha Ni, Status: Active   CommentMonroricardo Burton - 01 Sep 2016 12:30 PM     TASK CREATED  Caller: Self; Renew Medication; [phone] (Home)  Alprazolam  zoloft    Roseanna Quiñonez is completely out of medication and would like someone to call him when the scripts are called in  His cell number is:  [phone]  Roseanna Quiñonez left msg requesting medication renewals  Return appt is 10/2/2016  I renewed the following to the designated [facility] pharmacy:  Sertraline 100mg (1 1/2) tabs po qd # 45  Alprazolam 1mg (1) tab po tid prn anxiety # 90    Note, I did NOT give any refills on the above Rxs  Disregard the Alprazolam Rx in the log which shows 5 refills on today's Rx        Plan  Anxiety    · ALPRAZolam 1 MG Oral Tablet; 1 TABLET PO TID PRN ANXIETY  Anxiety,     · Sertraline HCl - 100 MG Oral Tablet (Zoloft); TAKE 1 and one=half  TABLET  DAILY    Signatures   Electronically signed by : DUSTIN Weber; Sep  1 2016  2:11PM EST                       (Author)

## 2018-01-22 VITALS — BODY MASS INDEX: 38.4 KG/M2 | WEIGHT: 230.5 LBS | HEIGHT: 65 IN

## 2018-01-22 VITALS
WEIGHT: 225 LBS | HEIGHT: 65 IN | BODY MASS INDEX: 37.49 KG/M2 | HEART RATE: 116 BPM | DIASTOLIC BLOOD PRESSURE: 92 MMHG | SYSTOLIC BLOOD PRESSURE: 132 MMHG

## 2018-08-20 NOTE — MISCELLANEOUS
Message   Recorded as Task   Date: 05/30/2017 02:16 PM, Created By: Marc Yi   Task Name: Med Renewal Request   Assigned To: Clarissa Henry   Regarding Patient: Juan Chavez, Status: Active   CommentNeill Oralia - 30 May 2017 2:16 PM     TASK CREATED  Caller: Self; Renew Medication; [phone] (Home)  PT called saying he needs a refill on all his medications  He says he has enough until this thursday  His next appointment is 06/07  Mis Daniel called for renewal of medicines  Return appt is 6/7/2017  I renewed the following to the designated [facility] pharmacy:  Alprazolam 1mg (1) tab po tid prn anxiety # 18  Duloxetine 60mg (1) cap po qd # 6  Melatonin 3mg (1-2) tabs po qhs prn sleep # 12      Plan  Depression, major, recurrent, moderate    · DULoxetine HCl - 60 MG Oral Capsule Delayed Release Particles; Take 1  capsule by mouth daily   · Melatonin 3 MG Oral Tablet; Take 1-2 tabs by mouth nightly, as needed for sleep  Generalized anxiety disorder    · ALPRAZolam 1 MG Oral Tablet;  Take 1 tablet PO TID prn anxiety    Signatures   Electronically signed by : DUSTIN Flaherty; May 30 2017  2:34PM EST                       (Author) Yes

## 2019-10-17 NOTE — RESULT NOTES
Message   can you please let pt know that his bw was withina normal range except his platelets which were minimally elevated> i would like to repeat this in 1 month and will print out an Rx, can you mail to the pt  iN addition, his vit b12 was low normal and encourage increasing vitamin B12 rich foods  Thank you     Verified Results  (1) CBC/PLT/DIFF 22Aug2016 09:19AM Patience Conway     Test Name Result Flag Reference   WBC COUNT 7 87 Thousand/uL  4 31-10 16   RBC COUNT 4 69 Million/uL  3 88-5 62   HEMOGLOBIN 12 6 g/dL  12 0-17 0   HEMATOCRIT 40 0 %  36 5-49 3   MCV 85 fL  82-98   MCH 26 9 pg  26 8-34 3   MCHC 31 5 g/dL  31 4-37 4   RDW 15 1 %  11 6-15 1   MPV 9 6 fL  8 9-12 7   PLATELET COUNT 565 Thousands/uL H 149-390   nRBC AUTOMATED 0 /100 WBCs     NEUTROPHILS RELATIVE PERCENT 51 %  43-75   LYMPHOCYTES RELATIVE PERCENT 37 %  14-44   MONOCYTES RELATIVE PERCENT 11 %  4-12   EOSINOPHILS RELATIVE PERCENT 1 %  0-6   BASOPHILS RELATIVE PERCENT 0 %  0-1   NEUTROPHILS ABSOLUTE COUNT 4 00 Thousands/?L  1 85-7 62   LYMPHOCYTES ABSOLUTE COUNT 2 88 Thousands/?L  0 60-4 47   MONOCYTES ABSOLUTE COUNT 0 86 Thousand/?L  0 17-1 22   EOSINOPHILS ABSOLUTE COUNT 0 10 Thousand/?L  0 00-0 61   BASOPHILS ABSOLUTE COUNT 0 01 Thousands/?L  0 00-0 10     (1) PREALBUMIN 47Uzt2511 09:19AM Patience Conway     Test Name Result Flag Reference   PREALBUMIN 27 8 mg/dL  18 0-40 0     (1) COMPREHENSIVE METABOLIC PANEL 11TUC8250 22:77MT Patience Conway     Test Name Result Flag Reference   GLUCOSE,RANDM 71 mg/dL     If the patient is fasting, the ADA then defines impaired fasting glucose as > 100 mg/dL and diabetes as > or equal to 123 mg/dL     SODIUM 138 mmol/L  136-145   POTASSIUM 4 3 mmol/L  3 5-5 3   CHLORIDE 101 mmol/L  100-108   CARBON DIOXIDE 29 mmol/L  21-32   ANION GAP (CALC) 8 mmol/L  4-13   BLOOD UREA NITROGEN 21 mg/dL  5-25   CREATININE 1 27 mg/dL  0 60-1 30   Standardized to IDMS reference method   CALCIUM 9 3 mg/dL 8  3-10 1   BILI, TOTAL 0 27 mg/dL  0 20-1 00   ALK PHOSPHATAS 89 U/L     ALT (SGPT) 32 U/L  12-78   AST(SGOT) 15 U/L  5-45   ALBUMIN 4 3 g/dL  3 5-5 0   TOTAL PROTEIN 7 7 g/dL  6 4-8 2   eGFR Non-African American      >60 0 ml/min/1 73sq m   [facility] Disease Education Program recommendations are as follows:  GFR calculation is accurate only with a steady state creatinine  Chronic Kidney disease less than 60 ml/min/1 73 sq  meters  Kidney failure less than 15 ml/min/1 73 sq  meters  (1) VITAMIN B12 87Fmh4948 09:19AM Patience Conway     Test Name Result Flag Reference   VITAMIN B12 376 pg/mL  100-900     (1) TSH WITH FT4 REFLEX 95Vdn2761 09:19AM Patience Conway     Test Name Result Flag Reference   TSH 2 430 uIU/mL  0 358-3 740   Patients undergoing fluorescein dye angiography may retain small amounts of fluorescein in the body for 48-72 hours post procedure  Samples containing fluorescein can produce falsely depressed TSH values  If the patient had this procedure,a specimen should be resubmitted post fluorescein clearance  with patient